# Patient Record
Sex: MALE | Race: WHITE | NOT HISPANIC OR LATINO | Employment: OTHER | URBAN - NONMETROPOLITAN AREA
[De-identification: names, ages, dates, MRNs, and addresses within clinical notes are randomized per-mention and may not be internally consistent; named-entity substitution may affect disease eponyms.]

---

## 2017-02-21 ENCOUNTER — OFFICE VISIT (OUTPATIENT)
Dept: NEUROSURGERY | Facility: CLINIC | Age: 62
End: 2017-02-21

## 2017-02-21 VITALS
WEIGHT: 270 LBS | SYSTOLIC BLOOD PRESSURE: 140 MMHG | HEIGHT: 68 IN | DIASTOLIC BLOOD PRESSURE: 80 MMHG | BODY MASS INDEX: 40.92 KG/M2

## 2017-02-21 DIAGNOSIS — E66.01 OBESITY, CLASS III, BMI 40-49.9 (MORBID OBESITY) (HCC): ICD-10-CM

## 2017-02-21 DIAGNOSIS — F17.210 CIGARETTE SMOKER: ICD-10-CM

## 2017-02-21 DIAGNOSIS — M51.37 DEGENERATION OF LUMBAR OR LUMBOSACRAL INTERVERTEBRAL DISC: Primary | ICD-10-CM

## 2017-02-21 DIAGNOSIS — M48.061 LUMBAR SPINAL STENOSIS: ICD-10-CM

## 2017-02-21 DIAGNOSIS — M51.26 LUMBAR DISC HERNIATION: ICD-10-CM

## 2017-02-21 PROCEDURE — 99203 OFFICE O/P NEW LOW 30 MIN: CPT | Performed by: NEUROLOGICAL SURGERY

## 2017-02-21 RX ORDER — BUDESONIDE AND FORMOTEROL FUMARATE DIHYDRATE 160; 4.5 UG/1; UG/1
2 AEROSOL RESPIRATORY (INHALATION)
COMMUNITY

## 2017-02-21 RX ORDER — FLUTICASONE PROPIONATE 50 MCG
2 SPRAY, SUSPENSION (ML) NASAL DAILY
COMMUNITY

## 2017-02-21 RX ORDER — ASPIRIN 81 MG/1
81 TABLET ORAL DAILY
COMMUNITY

## 2017-02-21 RX ORDER — CLOPIDOGREL BISULFATE 75 MG/1
75 TABLET ORAL DAILY
COMMUNITY

## 2017-02-21 RX ORDER — PRAVASTATIN SODIUM 40 MG
40 TABLET ORAL DAILY
COMMUNITY

## 2017-02-21 RX ORDER — HYDROCODONE BITARTRATE AND ACETAMINOPHEN 10; 325 MG/1; MG/1
1 TABLET ORAL EVERY 6 HOURS PRN
COMMUNITY

## 2017-02-21 RX ORDER — CETIRIZINE HYDROCHLORIDE 10 MG/1
10 TABLET ORAL 2 TIMES DAILY
COMMUNITY

## 2017-02-21 RX ORDER — LOSARTAN POTASSIUM AND HYDROCHLOROTHIAZIDE 25; 100 MG/1; MG/1
1 TABLET ORAL DAILY
COMMUNITY

## 2017-02-21 RX ORDER — CARVEDILOL 3.12 MG/1
3.12 TABLET ORAL 2 TIMES DAILY WITH MEALS
COMMUNITY
End: 2019-04-06 | Stop reason: HOSPADM

## 2017-02-21 RX ORDER — HYDROCHLOROTHIAZIDE 12.5 MG/1
12.5 TABLET ORAL DAILY
COMMUNITY

## 2017-02-21 RX ORDER — DULOXETIN HYDROCHLORIDE 60 MG/1
60 CAPSULE, DELAYED RELEASE ORAL DAILY
COMMUNITY

## 2017-02-21 NOTE — PATIENT INSTRUCTIONS
BMI for Adults  Body mass index (BMI) is a number that is calculated from a person's weight and height. In most adults, the number is used to find how much of an adult's weight is made up of fat. BMI is not as accurate as a direct measure of body fat.  HOW IS BMI CALCULATED?  BMI is calculated by dividing weight in kilograms by height in meters squared. It can also be calculated by dividing weight in pounds by height in inches squared, then multiplying the resulting number by 703. Charts are available to help you find your BMI quickly and easily without doing this calculation.   HOW IS BMI INTERPRETED?  Health care professionals use BMI charts to identify whether an adult is underweight, at a normal weight, or overweight based on the following guidelines:  · Underweight: BMI less than 18.5.  · Normal weight: BMI between 18.5 and 24.9.  · Overweight: BMI between 25 and 29.9.  · Obese: BMI of 30 and above.  BMI is usually interpreted the same for males and females.  Weight includes both fat and muscle, so someone with a muscular build, such as an athlete, may have a BMI that is higher than 24.9. In cases like these, BMI may not accurately depict body fat. To determine if excess body fat is the cause of a BMI of 25 or higher, further assessments may need to be done by a health care provider.  WHY IS BMI A USEFUL TOOL?  BMI is used to identify a possible weight problem that may be related to a medical problem or may increase the risk for medical problems. BMI can also be used to promote changes to reach a healthy weight.     This information is not intended to replace advice given to you by your health care provider. Make sure you discuss any questions you have with your health care provider.     Document Released: 08/29/2005 Document Revised: 01/08/2016 Document Reviewed: 05/15/2015  [employer] Interactive Patient Education ©2016 [employer]  Steps to Quit Smoking   Smoking tobacco can be harmful to your health and can  affect almost every organ in your body. Smoking puts you, and those around you, at risk for developing many serious chronic diseases. Quitting smoking is difficult, but it is one of the best things that you can do for your health. It is never too late to quit.  WHAT ARE THE BENEFITS OF QUITTING SMOKING?  When you quit smoking, you lower your risk of developing serious diseases and conditions, such as:  · Lung cancer or lung disease, such as COPD.  · Heart disease.  · Stroke.  · Heart attack.  · Infertility.  · Osteoporosis and bone fractures.  Additionally, symptoms such as coughing, wheezing, and shortness of breath may get better when you quit. You may also find that you get sick less often because your body is stronger at fighting off colds and infections. If you are pregnant, quitting smoking can help to reduce your chances of having a baby of low birth weight.  HOW DO I GET READY TO QUIT?  When you decide to quit smoking, create a plan to make sure that you are successful. Before you quit:  · Pick a date to quit. Set a date within the next two weeks to give you time to prepare.  · Write down the reasons why you are quitting. Keep this list in places where you will see it often, such as on your bathroom mirror or in your car or wallet.  · Identify the people, places, things, and activities that make you want to smoke (triggers) and avoid them. Make sure to take these actions:    Throw away all cigarettes at home, at work, and in your car.    Throw away smoking accessories, such as ashtrays and lighters.    Clean your car and make sure to empty the ashtray.    Clean your home, including curtains and carpets.  · Tell your family, friends, and coworkers that you are quitting. Support from your loved ones can make quitting easier.  · Talk with your health care provider about your options for quitting smoking.  · Find out what treatment options are covered by your health insurance.  WHAT STRATEGIES CAN I USE TO QUIT  "SMOKING?   Talk with your healthcare provider about different strategies to quit smoking. Some strategies include:  · Quitting smoking altogether instead of gradually lessening how much you smoke over a period of time. Research shows that quitting \"cold turkey\" is more successful than gradually quitting.  · Attending in-person counseling to help you build problem-solving skills. You are more likely to have success in quitting if you attend several counseling sessions. Even short sessions of 10 minutes can be effective.  · Finding resources and support systems that can help you to quit smoking and remain smoke-free after you quit. These resources are most helpful when you use them often. They can include:    Online chats with a counselor.    Telephone quitlines.    Printed self-help materials.    Support groups or group counseling.    Text messaging programs.    Mobile phone applications.  · Taking medicines to help you quit smoking. (If you are pregnant or breastfeeding, talk with your health care provider first.) Some medicines contain nicotine and some do not. Both types of medicines help with cravings, but the medicines that include nicotine help to relieve withdrawal symptoms. Your health care provider may recommend:    Nicotine patches, gum, or lozenges.    Nicotine inhalers or sprays.    Non-nicotine medicine that is taken by mouth.  Talk with your health care provider about combining strategies, such as taking medicines while you are also receiving in-person counseling. Using these two strategies together makes you more likely to succeed in quitting than if you used either strategy on its own.  If you are pregnant or breastfeeding, talk with your health care provider about finding counseling or other support strategies to quit smoking. Do not take medicine to help you quit smoking unless told to do so by your health care provider.  WHAT THINGS CAN I DO TO MAKE IT EASIER TO QUIT?  Quitting smoking might feel " overwhelming at first, but there is a lot that you can do to make it easier. Take these important actions:  · Reach out to your family and friends and ask that they support and encourage you during this time. Call telephone quitlines, reach out to support groups, or work with a counselor for support.  · Ask people who smoke to avoid smoking around you.  · Avoid places that trigger you to smoke, such as bars, parties, or smoke-break areas at work.  · Spend time around people who do not smoke.  · Lessen stress in your life, because stress can be a smoking trigger for some people. To lessen stress, try:    Exercising regularly.    Deep-breathing exercises.    Yoga.    Meditating.    Performing a body scan. This involves closing your eyes, scanning your body from head to toe, and noticing which parts of your body are particularly tense. Purposefully relax the muscles in those areas.  · Download or purchase mobile phone or tablet apps (applications) that can help you stick to your quit plan by providing reminders, tips, and encouragement. There are many free apps, such as QuitGuide from the CDC (Centers for Disease Control and Prevention). You can find other support for quitting smoking (smoking cessation) through smokefree.gov and other websites.  HOW WILL I FEEL WHEN I QUIT SMOKING?  Within the first 24 hours of quitting smoking, you may start to feel some withdrawal symptoms. These symptoms are usually most noticeable 2-3 days after quitting, but they usually do not last beyond 2-3 weeks. Changes or symptoms that you might experience include:  · Mood swings.  · Restlessness, anxiety, or irritation.  · Difficulty concentrating.  · Dizziness.  · Strong cravings for sugary foods in addition to nicotine.  · Mild weight gain.  · Constipation.  · Nausea.  · Coughing or a sore throat.  · Changes in how your medicines work in your body.  · A depressed mood.  · Difficulty sleeping (insomnia).  After the first 2-3 weeks of  quitting, you may start to notice more positive results, such as:  · Improved sense of smell and taste.  · Decreased coughing and sore throat.  · Slower heart rate.  · Lower blood pressure.  · Clearer skin.  · The ability to breathe more easily.  · Fewer sick days.  Quitting smoking is very challenging for most people. Do not get discouraged if you are not successful the first time. Some people need to make many attempts to quit before they achieve long-term success. Do your best to stick to your quit plan, and talk with your health care provider if you have any questions or concerns.     This information is not intended to replace advice given to you by your health care provider. Make sure you discuss any questions you have with your health care provider.     Document Released: 12/12/2002 Document Revised: 05/03/2016 Document Reviewed: 05/03/2016  [employer] Interactive Patient Education ©2016 [employer]

## 2017-02-22 NOTE — PROGRESS NOTES
Patient: Andrez Tsang  : 1955    Primary Care Provider: Susan Oliveira DO    Requesting Provider: No ref. provider found        History    Chief Complaint: Back pain  Chief Complaint   Patient presents with   • Back Pain     imaging from [facility]   • Leg Pain       History of Present Illness: This is a 61-year-old gentleman who has several year history of back pain.  He describes is intermittent but daily.  It is worsened by bending lifting twisting pushing or pulling standing for too long.  In general getting office he will bring him some relief but then he is very clear that if he holds any position for too long lying sitting or standing and he has to change positions to get relief.  He describes no lower extremity pain numbness or tingling. he is currently taking Norco 10 mg 4 a day.  He is done no chiropractic care, no physical therapy, no pain management injections.  He is disabled due to peripheral vascular disease.    Review of Systems   Musculoskeletal: Positive for back pain.   All other systems reviewed and are negative.      Past Medical History:   Past Medical History   Diagnosis Date   • Arthritis    • Asthma    • Chronic joint pain    • Chronic sinusitis    • Depression    • Shortness of breath        Past Surgical History:   Past Surgical History   Procedure Laterality Date   • Vasectomy     • Vascular surgery       femoral artery bypass - Dr Tom   • Gallbladder surgery     • Carotid endarterectomy  2016       Family History: family history includes Diabetes in his mother; Heart disease in his mother; Hypertension in his mother.    Social History:  reports that he has been smoking Cigarettes.  He has been smoking about 0.25 packs per day. He has never used smokeless tobacco. He reports that he does not drink alcohol or use illicit drugs.    Medications:    (Not in a hospital admission)    Allergies:  Iodides    Physical Exam:     Physical Exam   Constitutional: He is  oriented to person, place, and time. He appears well-developed and well-nourished.   HENT:   Head: Normocephalic and atraumatic.   Right Ear: Hearing normal.   Left Ear: Hearing normal.   Eyes: EOM are normal. Pupils are equal, round, and reactive to light.   Neck: Normal range of motion.   Cardiovascular: Normal rate, regular rhythm and normal heart sounds.    Pulmonary/Chest: Effort normal and breath sounds normal. No respiratory distress.   Abdominal: Soft. Bowel sounds are normal. He exhibits no distension. There is no tenderness.   Neurological: He is alert and oriented to person, place, and time. He has normal strength and normal reflexes. No cranial nerve deficit or sensory deficit. He displays a negative Romberg sign. GCS eye subscore is 4. GCS verbal subscore is 5. GCS motor subscore is 6. He displays no Babinski's sign on the right side. He displays no Babinski's sign on the left side.   Psychiatric: His speech is normal. Judgment normal. Cognition and memory are normal.       Neurologic Exam     Mental Status   Oriented to person, place, and time.   Speech: speech is normal     Cranial Nerves     CN III, IV, VI   Pupils are equal, round, and reactive to light.  Extraocular motions are normal.     Motor Exam     Strength   Strength 5/5 throughout.         Independent Review of Radiographic Studies:   MRI and plain films show degenerative disc disease at L5-S1 with Modic endplate changes.  He has moderate degenerative disc disease at L5-L4 with some disc bulging at that level    ASSESSMENT/PLAN: Mr. Tsang has mechanical back pain he has degenerative disc disease in his lower lumbar area.  He is a half a pack a day smoker and approximately 100 pounds overweight these are all factors contributing to his back pain.  Counseled him regarding these issues. We are going get him into a physical therapy program a referral for pain management injections.  We would like to see him go through complete course of  conservative therapy before considering any sort of surgery.  1. Degeneration of lumbar or lumbosacral intervertebral disc    2. Lumbar spinal stenosis    3. Lumbar disc herniation    4. Cigarette smoker    5. Obesity, Class III, BMI 40-49.9 (morbid obesity)          Return in about 6 weeks (around 4/4/2017) for PT follow up w/[facility].      Rudi Adorno MD

## 2017-07-24 ENCOUNTER — HOSPITAL ENCOUNTER (OUTPATIENT)
Dept: PAIN MANAGEMENT | Age: 62
Discharge: HOME | End: 2017-07-24
Payer: MEDICAID

## 2017-07-24 VITALS
HEIGHT: 68 IN | BODY MASS INDEX: 39.92 KG/M2 | TEMPERATURE: 98 F | SYSTOLIC BLOOD PRESSURE: 183 MMHG | DIASTOLIC BLOOD PRESSURE: 90 MMHG | OXYGEN SATURATION: 93 % | RESPIRATION RATE: 20 BRPM | WEIGHT: 263.38 LBS | HEART RATE: 91 BPM

## 2017-07-24 DIAGNOSIS — M54.59 LUMBAR FACET JOINT PAIN: ICD-10-CM

## 2017-07-24 DIAGNOSIS — M70.62 TROCHANTERIC BURSITIS OF BOTH HIPS: ICD-10-CM

## 2017-07-24 DIAGNOSIS — M54.50 CHRONIC BILATERAL LOW BACK PAIN WITHOUT SCIATICA: ICD-10-CM

## 2017-07-24 DIAGNOSIS — M70.61 TROCHANTERIC BURSITIS OF BOTH HIPS: ICD-10-CM

## 2017-07-24 DIAGNOSIS — G89.29 CHRONIC BILATERAL LOW BACK PAIN WITHOUT SCIATICA: ICD-10-CM

## 2017-07-24 DIAGNOSIS — M62.830 MUSCLE SPASM OF BACK: ICD-10-CM

## 2017-07-24 DIAGNOSIS — M53.3 SACROILIAC JOINT DYSFUNCTION OF LEFT SIDE: ICD-10-CM

## 2017-07-24 PROCEDURE — 99204 OFFICE O/P NEW MOD 45 MIN: CPT | Performed by: NURSE PRACTITIONER

## 2017-07-24 PROCEDURE — 99205 OFFICE O/P NEW HI 60 MIN: CPT

## 2017-07-24 RX ORDER — HYDROCODONE BITARTRATE AND ACETAMINOPHEN 10; 325 MG/1; MG/1
1 TABLET ORAL EVERY 6 HOURS PRN
Qty: 120 TABLET | Refills: 0 | Status: SHIPPED | OUTPATIENT
Start: 2017-07-27 | End: 2017-08-23 | Stop reason: SDUPTHER

## 2017-07-24 RX ORDER — HYDROCHLOROTHIAZIDE 12.5 MG/1
12.5 TABLET ORAL DAILY
Status: ON HOLD | COMMUNITY
End: 2022-10-09

## 2017-07-24 RX ORDER — ASPIRIN 81 MG/1
81 TABLET ORAL DAILY
COMMUNITY

## 2017-07-24 RX ORDER — DULOXETIN HYDROCHLORIDE 60 MG/1
60 CAPSULE, DELAYED RELEASE ORAL DAILY
COMMUNITY

## 2017-07-24 RX ORDER — CLOPIDOGREL BISULFATE 75 MG/1
75 TABLET ORAL DAILY
COMMUNITY

## 2017-07-24 RX ORDER — PRAVASTATIN SODIUM 40 MG
40 TABLET ORAL DAILY
Status: ON HOLD | COMMUNITY
End: 2022-10-09

## 2017-07-24 RX ORDER — HYDROCODONE BITARTRATE AND ACETAMINOPHEN 10; 325 MG/1; MG/1
1 TABLET ORAL EVERY 6 HOURS PRN
COMMUNITY
End: 2017-08-23 | Stop reason: SDUPTHER

## 2017-07-24 RX ORDER — LOSARTAN POTASSIUM 100 MG/1
100 TABLET ORAL DAILY
COMMUNITY

## 2017-07-24 RX ORDER — METHOCARBAMOL 750 MG/1
750 TABLET, FILM COATED ORAL 3 TIMES DAILY
Qty: 90 TABLET | Refills: 0 | Status: SHIPPED | OUTPATIENT
Start: 2017-07-24 | End: 2017-10-04 | Stop reason: SDUPTHER

## 2017-07-24 RX ORDER — CARVEDILOL 3.12 MG/1
3.12 TABLET ORAL 2 TIMES DAILY
Status: ON HOLD | COMMUNITY
End: 2022-10-09

## 2017-08-23 DIAGNOSIS — M54.59 LUMBAR FACET JOINT PAIN: ICD-10-CM

## 2017-08-23 DIAGNOSIS — M54.50 CHRONIC BILATERAL LOW BACK PAIN WITHOUT SCIATICA: ICD-10-CM

## 2017-08-23 DIAGNOSIS — M53.3 SACROILIAC JOINT DYSFUNCTION OF LEFT SIDE: ICD-10-CM

## 2017-08-23 DIAGNOSIS — G89.29 CHRONIC BILATERAL LOW BACK PAIN WITHOUT SCIATICA: ICD-10-CM

## 2017-08-23 DIAGNOSIS — M70.61 TROCHANTERIC BURSITIS OF BOTH HIPS: ICD-10-CM

## 2017-08-23 DIAGNOSIS — M62.830 MUSCLE SPASM OF BACK: ICD-10-CM

## 2017-08-23 DIAGNOSIS — M70.62 TROCHANTERIC BURSITIS OF BOTH HIPS: ICD-10-CM

## 2017-08-23 RX ORDER — HYDROCODONE BITARTRATE AND ACETAMINOPHEN 10; 325 MG/1; MG/1
1 TABLET ORAL EVERY 6 HOURS PRN
Qty: 120 TABLET | Refills: 0 | Status: SHIPPED | OUTPATIENT
Start: 2017-08-26 | End: 2017-09-21 | Stop reason: SDUPTHER

## 2017-09-21 DIAGNOSIS — M53.3 SACROILIAC JOINT DYSFUNCTION OF LEFT SIDE: ICD-10-CM

## 2017-09-21 DIAGNOSIS — M62.830 MUSCLE SPASM OF BACK: ICD-10-CM

## 2017-09-21 DIAGNOSIS — M54.59 LUMBAR FACET JOINT PAIN: ICD-10-CM

## 2017-09-21 DIAGNOSIS — G89.29 CHRONIC BILATERAL LOW BACK PAIN WITHOUT SCIATICA: ICD-10-CM

## 2017-09-21 DIAGNOSIS — M70.62 TROCHANTERIC BURSITIS OF BOTH HIPS: ICD-10-CM

## 2017-09-21 DIAGNOSIS — M54.50 CHRONIC BILATERAL LOW BACK PAIN WITHOUT SCIATICA: ICD-10-CM

## 2017-09-21 DIAGNOSIS — M70.61 TROCHANTERIC BURSITIS OF BOTH HIPS: ICD-10-CM

## 2017-09-21 RX ORDER — HYDROCODONE BITARTRATE AND ACETAMINOPHEN 10; 325 MG/1; MG/1
1 TABLET ORAL EVERY 6 HOURS PRN
Qty: 120 TABLET | Refills: 0 | Status: SHIPPED | OUTPATIENT
Start: 2017-09-25 | End: 2017-10-23 | Stop reason: SDUPTHER

## 2017-10-04 DIAGNOSIS — M62.830 MUSCLE SPASM OF BACK: ICD-10-CM

## 2017-10-04 DIAGNOSIS — M53.3 SACROILIAC JOINT DYSFUNCTION OF LEFT SIDE: ICD-10-CM

## 2017-10-04 DIAGNOSIS — M70.62 TROCHANTERIC BURSITIS OF BOTH HIPS: ICD-10-CM

## 2017-10-04 DIAGNOSIS — G89.29 CHRONIC BILATERAL LOW BACK PAIN WITHOUT SCIATICA: ICD-10-CM

## 2017-10-04 DIAGNOSIS — M70.61 TROCHANTERIC BURSITIS OF BOTH HIPS: ICD-10-CM

## 2017-10-04 DIAGNOSIS — M54.59 LUMBAR FACET JOINT PAIN: ICD-10-CM

## 2017-10-04 DIAGNOSIS — M54.50 CHRONIC BILATERAL LOW BACK PAIN WITHOUT SCIATICA: ICD-10-CM

## 2017-10-05 RX ORDER — METHOCARBAMOL 750 MG/1
750 TABLET, FILM COATED ORAL 3 TIMES DAILY
Qty: 90 TABLET | Refills: 0 | Status: SHIPPED | OUTPATIENT
Start: 2017-10-05 | End: 2017-12-19 | Stop reason: SDUPTHER

## 2017-10-23 DIAGNOSIS — M62.830 MUSCLE SPASM OF BACK: ICD-10-CM

## 2017-10-23 DIAGNOSIS — M70.61 TROCHANTERIC BURSITIS OF BOTH HIPS: ICD-10-CM

## 2017-10-23 DIAGNOSIS — M70.62 TROCHANTERIC BURSITIS OF BOTH HIPS: ICD-10-CM

## 2017-10-23 DIAGNOSIS — G89.29 CHRONIC BILATERAL LOW BACK PAIN WITHOUT SCIATICA: ICD-10-CM

## 2017-10-23 DIAGNOSIS — M54.50 CHRONIC BILATERAL LOW BACK PAIN WITHOUT SCIATICA: ICD-10-CM

## 2017-10-23 DIAGNOSIS — M54.59 LUMBAR FACET JOINT PAIN: ICD-10-CM

## 2017-10-23 DIAGNOSIS — M53.3 SACROILIAC JOINT DYSFUNCTION OF LEFT SIDE: ICD-10-CM

## 2017-10-24 RX ORDER — HYDROCODONE BITARTRATE AND ACETAMINOPHEN 10; 325 MG/1; MG/1
1 TABLET ORAL EVERY 6 HOURS PRN
Qty: 120 TABLET | Refills: 0 | Status: SHIPPED | OUTPATIENT
Start: 2017-10-25 | End: 2017-11-21 | Stop reason: SDUPTHER

## 2017-10-31 ENCOUNTER — HOSPITAL ENCOUNTER (OUTPATIENT)
Dept: PAIN MANAGEMENT | Age: 62
Discharge: HOME | End: 2017-10-31
Payer: MEDICAID

## 2017-10-31 VITALS
DIASTOLIC BLOOD PRESSURE: 70 MMHG | HEART RATE: 97 BPM | OXYGEN SATURATION: 97 % | WEIGHT: 265.38 LBS | SYSTOLIC BLOOD PRESSURE: 145 MMHG | HEIGHT: 68 IN | TEMPERATURE: 96.7 F | RESPIRATION RATE: 16 BRPM | BODY MASS INDEX: 40.22 KG/M2

## 2017-10-31 DIAGNOSIS — M62.830 MUSCLE SPASM OF BACK: ICD-10-CM

## 2017-10-31 DIAGNOSIS — M53.3 SACROILIAC JOINT DYSFUNCTION OF LEFT SIDE: ICD-10-CM

## 2017-10-31 DIAGNOSIS — M54.50 CHRONIC BILATERAL LOW BACK PAIN WITHOUT SCIATICA: ICD-10-CM

## 2017-10-31 DIAGNOSIS — M54.59 LUMBAR FACET JOINT PAIN: ICD-10-CM

## 2017-10-31 DIAGNOSIS — M70.62 TROCHANTERIC BURSITIS OF BOTH HIPS: ICD-10-CM

## 2017-10-31 DIAGNOSIS — G89.29 CHRONIC BILATERAL LOW BACK PAIN WITHOUT SCIATICA: ICD-10-CM

## 2017-10-31 DIAGNOSIS — M70.61 TROCHANTERIC BURSITIS OF BOTH HIPS: ICD-10-CM

## 2017-10-31 LAB — SUMMARY COMPLIANCE DRUG ANAL, UR: NORMAL ML

## 2017-10-31 PROCEDURE — 99213 OFFICE O/P EST LOW 20 MIN: CPT

## 2017-10-31 PROCEDURE — 80307 DRUG TEST PRSMV CHEM ANLYZR: CPT

## 2017-10-31 RX ORDER — LISINOPRIL 10 MG/1
1 TABLET ORAL DAILY
COMMUNITY
Start: 2017-10-20 | End: 2018-04-04 | Stop reason: DRUGHIGH

## 2017-11-02 NOTE — PROGRESS NOTES
[facility] FOLLOW-UP NOTE  10/31/2017    PCP: [facility] Pain Mgmt Provider: Miguelangel Knowles MSN,CNOR,RNFA,ARNP,FNP-C  Subjective:      PAIN HPI:  Mr Eusebio Rojas comes in today for f/u, he rates his Lbp at 1/10. The Pt is currently taking Norco, Cymbalta, Robaxin. Pt states that these are helpful. The Pt completed 4 visits of aquatic PT. Pt states that this was helpful               ALLERGIES:   Allergies   Allergen Reactions    Dye [Iodides]      Suspected with reaction 48 hours after angiogram       CURRENT MEDICATIONS:   No current facility-administered medications for this encounter. No results found. Objective:     Physical Exam:  Vitals:    10/31/17 0954   BP: (!) 145/70   Pulse: 97   Resp: 16   Temp: 96.7 °F (35.9 °C)   TempSrc: Temporal   SpO2: 97%   Weight: 265 lb 6 oz (120.4 kg)   Height: 5' 8\" (1.727 m)     General appearance: no acute distress  Head: NCAT, PERRLA, EOMI  Neck: supple, no masses  Lungs: CTABL  Heart: RRR  Abdomen: soft, nondistended, nontender, no guarding or peritoneal signs. Extremities: no rash cyanosis edema or jaundice  SKIN: Warm, Dry, and Intact   Musculoskeletal:No joint swelling, deformity, or tenderness. Back Exam:, tenderness: yes pain to palpation with spasm at bilateral Lumbar. Pain with palpation at Left SI Joint.   Neck Exam:no tenderness:   Hip Exam: Bilateral Greater trochanter tenderness  Shoulder Exam: good rom nontender  Knee exam: full rom no effusion  Neurologic: gait and coordination normal and speech normal  CN EXAM: II-XII intact  Reflexesbiceps, brachioradialis, triceps, patellar and Achilles 1/4  Sensation: Equal and intact to fine touch  Mood and affect: within normal limits    Review of Systems:  14 point ROS negative besides that noted in HPI  Complains of constipation negative      Imaging:                                                                                Assessment:     Patient Active

## 2017-11-21 DIAGNOSIS — M54.59 LUMBAR FACET JOINT PAIN: ICD-10-CM

## 2017-11-21 DIAGNOSIS — G89.29 CHRONIC BILATERAL LOW BACK PAIN WITHOUT SCIATICA: ICD-10-CM

## 2017-11-21 DIAGNOSIS — M70.61 TROCHANTERIC BURSITIS OF BOTH HIPS: ICD-10-CM

## 2017-11-21 DIAGNOSIS — M54.50 CHRONIC BILATERAL LOW BACK PAIN WITHOUT SCIATICA: ICD-10-CM

## 2017-11-21 DIAGNOSIS — M53.3 SACROILIAC JOINT DYSFUNCTION OF LEFT SIDE: ICD-10-CM

## 2017-11-21 DIAGNOSIS — M62.830 MUSCLE SPASM OF BACK: ICD-10-CM

## 2017-11-21 DIAGNOSIS — M70.62 TROCHANTERIC BURSITIS OF BOTH HIPS: ICD-10-CM

## 2017-11-22 RX ORDER — HYDROCODONE BITARTRATE AND ACETAMINOPHEN 10; 325 MG/1; MG/1
1 TABLET ORAL EVERY 6 HOURS PRN
Qty: 120 TABLET | Refills: 0 | Status: SHIPPED | OUTPATIENT
Start: 2017-11-24 | End: 2017-12-19 | Stop reason: SDUPTHER

## 2017-12-19 DIAGNOSIS — G89.29 CHRONIC BILATERAL LOW BACK PAIN WITHOUT SCIATICA: ICD-10-CM

## 2017-12-19 DIAGNOSIS — M70.62 TROCHANTERIC BURSITIS OF BOTH HIPS: ICD-10-CM

## 2017-12-19 DIAGNOSIS — M54.59 LUMBAR FACET JOINT PAIN: ICD-10-CM

## 2017-12-19 DIAGNOSIS — M53.3 SACROILIAC JOINT DYSFUNCTION OF LEFT SIDE: ICD-10-CM

## 2017-12-19 DIAGNOSIS — M62.830 MUSCLE SPASM OF BACK: ICD-10-CM

## 2017-12-19 DIAGNOSIS — M70.61 TROCHANTERIC BURSITIS OF BOTH HIPS: ICD-10-CM

## 2017-12-19 DIAGNOSIS — M54.50 CHRONIC BILATERAL LOW BACK PAIN WITHOUT SCIATICA: ICD-10-CM

## 2017-12-20 RX ORDER — METHOCARBAMOL 750 MG/1
750 TABLET, FILM COATED ORAL 3 TIMES DAILY PRN
Qty: 90 TABLET | Refills: 0 | Status: SHIPPED | OUTPATIENT
Start: 2017-12-20 | End: 2018-04-04 | Stop reason: SDUPTHER

## 2017-12-20 RX ORDER — HYDROCODONE BITARTRATE AND ACETAMINOPHEN 10; 325 MG/1; MG/1
1 TABLET ORAL EVERY 6 HOURS PRN
Qty: 120 TABLET | Refills: 0 | Status: SHIPPED | OUTPATIENT
Start: 2017-12-24 | End: 2018-01-17 | Stop reason: SDUPTHER

## 2018-01-17 DIAGNOSIS — M54.59 LUMBAR FACET JOINT PAIN: ICD-10-CM

## 2018-01-17 DIAGNOSIS — M70.62 TROCHANTERIC BURSITIS OF BOTH HIPS: ICD-10-CM

## 2018-01-17 DIAGNOSIS — M62.830 MUSCLE SPASM OF BACK: ICD-10-CM

## 2018-01-17 DIAGNOSIS — G89.29 CHRONIC BILATERAL LOW BACK PAIN WITHOUT SCIATICA: ICD-10-CM

## 2018-01-17 DIAGNOSIS — M70.61 TROCHANTERIC BURSITIS OF BOTH HIPS: ICD-10-CM

## 2018-01-17 DIAGNOSIS — M53.3 SACROILIAC JOINT DYSFUNCTION OF LEFT SIDE: ICD-10-CM

## 2018-01-17 DIAGNOSIS — M54.50 CHRONIC BILATERAL LOW BACK PAIN WITHOUT SCIATICA: ICD-10-CM

## 2018-01-17 RX ORDER — HYDROCODONE BITARTRATE AND ACETAMINOPHEN 10; 325 MG/1; MG/1
1 TABLET ORAL EVERY 6 HOURS PRN
Qty: 120 TABLET | Refills: 0 | Status: SHIPPED | OUTPATIENT
Start: 2018-01-23 | End: 2018-02-19 | Stop reason: SDUPTHER

## 2018-01-30 ENCOUNTER — HOSPITAL ENCOUNTER (OUTPATIENT)
Dept: PAIN MANAGEMENT | Age: 63
Discharge: HOME | End: 2018-01-30
Payer: MEDICAID

## 2018-01-30 DIAGNOSIS — M62.830 MUSCLE SPASM OF BACK: ICD-10-CM

## 2018-01-30 DIAGNOSIS — M54.50 CHRONIC BILATERAL LOW BACK PAIN WITHOUT SCIATICA: ICD-10-CM

## 2018-01-30 DIAGNOSIS — M70.62 TROCHANTERIC BURSITIS OF BOTH HIPS: ICD-10-CM

## 2018-01-30 DIAGNOSIS — M54.59 LUMBAR FACET JOINT PAIN: ICD-10-CM

## 2018-01-30 DIAGNOSIS — M53.3 SACROILIAC JOINT DYSFUNCTION OF LEFT SIDE: ICD-10-CM

## 2018-01-30 DIAGNOSIS — M70.61 TROCHANTERIC BURSITIS OF BOTH HIPS: ICD-10-CM

## 2018-01-30 DIAGNOSIS — G89.29 CHRONIC BILATERAL LOW BACK PAIN WITHOUT SCIATICA: ICD-10-CM

## 2018-01-30 PROCEDURE — 99211 OFF/OP EST MAY X REQ PHY/QHP: CPT

## 2018-01-30 NOTE — PROGRESS NOTES
Current Medication Regimen Working? yes    Current Pain Level (1-10 Scale)/Location: 3    Current Health Issues/Falls/ER Visit:In-patient 8 days flu / pneumonia/ and staph one month ago    Previous Back or Neck Surgeries: n/a    Last Procedure Performed: 10/17                                                                Percentage of Pain Relief from Last procedure:    How Long it Lasted:     Recent Radiology/Labs/ Physical Therapy: aquatic therapy 6/17    Any change in bowel habits if on chronic narcotics  constipation   If yes, do you use laxatives regularly yes miralax daily            Has patient considered harming themselves to escape stress, pain , or problems:  ( ) YES or ( x) NO    (  ) Suspected Physical Abuse or Suicide Risk assessed : (See Suicide Risk Assessment progress note if checked)            Education Provided: ( x ) Review of Daniel  (  ) Agreement Review    (  ) Compliance Issues Discussed  (  ) Cognitive Behavioral Needs   (  ) Exercise  (  ) Review of Test  (  ) Financial Issues  ( x ) Tobacco/Alcohol Use  ( x ) Teaching  (  ) New Patient  (  ) Picture Obtained  ( ) Test Ordered    (  ) Outgoing Referral  ( ) Records Obtained

## 2018-02-19 DIAGNOSIS — G89.29 CHRONIC BILATERAL LOW BACK PAIN WITHOUT SCIATICA: ICD-10-CM

## 2018-02-19 DIAGNOSIS — M53.3 SACROILIAC JOINT DYSFUNCTION OF LEFT SIDE: ICD-10-CM

## 2018-02-19 DIAGNOSIS — M54.50 CHRONIC BILATERAL LOW BACK PAIN WITHOUT SCIATICA: ICD-10-CM

## 2018-02-19 DIAGNOSIS — M70.61 TROCHANTERIC BURSITIS OF BOTH HIPS: ICD-10-CM

## 2018-02-19 DIAGNOSIS — M62.830 MUSCLE SPASM OF BACK: ICD-10-CM

## 2018-02-19 DIAGNOSIS — M70.62 TROCHANTERIC BURSITIS OF BOTH HIPS: ICD-10-CM

## 2018-02-19 DIAGNOSIS — M54.59 LUMBAR FACET JOINT PAIN: ICD-10-CM

## 2018-02-20 RX ORDER — HYDROCODONE BITARTRATE AND ACETAMINOPHEN 10; 325 MG/1; MG/1
1 TABLET ORAL EVERY 6 HOURS PRN
Qty: 120 TABLET | Refills: 0 | Status: SHIPPED | OUTPATIENT
Start: 2018-02-22 | End: 2018-03-20 | Stop reason: SDUPTHER

## 2018-03-20 DIAGNOSIS — M70.62 TROCHANTERIC BURSITIS OF BOTH HIPS: ICD-10-CM

## 2018-03-20 DIAGNOSIS — M53.3 SACROILIAC JOINT DYSFUNCTION OF LEFT SIDE: ICD-10-CM

## 2018-03-20 DIAGNOSIS — M70.61 TROCHANTERIC BURSITIS OF BOTH HIPS: ICD-10-CM

## 2018-03-20 DIAGNOSIS — M54.50 CHRONIC BILATERAL LOW BACK PAIN WITHOUT SCIATICA: ICD-10-CM

## 2018-03-20 DIAGNOSIS — M54.59 LUMBAR FACET JOINT PAIN: ICD-10-CM

## 2018-03-20 DIAGNOSIS — M62.830 MUSCLE SPASM OF BACK: ICD-10-CM

## 2018-03-20 DIAGNOSIS — G89.29 CHRONIC BILATERAL LOW BACK PAIN WITHOUT SCIATICA: ICD-10-CM

## 2018-03-20 RX ORDER — HYDROCODONE BITARTRATE AND ACETAMINOPHEN 10; 325 MG/1; MG/1
1 TABLET ORAL EVERY 6 HOURS PRN
Qty: 120 TABLET | Refills: 0 | Status: SHIPPED | OUTPATIENT
Start: 2018-03-24 | End: 2018-04-17 | Stop reason: SDUPTHER

## 2018-04-04 ENCOUNTER — HOSPITAL ENCOUNTER (OUTPATIENT)
Dept: PAIN MANAGEMENT | Age: 63
Discharge: HOME | End: 2018-04-04
Payer: MEDICAID

## 2018-04-04 VITALS
HEIGHT: 68 IN | BODY MASS INDEX: 39.03 KG/M2 | HEART RATE: 79 BPM | RESPIRATION RATE: 20 BRPM | WEIGHT: 257.5 LBS | TEMPERATURE: 99 F | OXYGEN SATURATION: 93 % | SYSTOLIC BLOOD PRESSURE: 159 MMHG | DIASTOLIC BLOOD PRESSURE: 79 MMHG

## 2018-04-04 DIAGNOSIS — M54.59 LUMBAR FACET JOINT PAIN: ICD-10-CM

## 2018-04-04 DIAGNOSIS — M70.61 TROCHANTERIC BURSITIS OF BOTH HIPS: ICD-10-CM

## 2018-04-04 DIAGNOSIS — M54.50 CHRONIC BILATERAL LOW BACK PAIN WITHOUT SCIATICA: ICD-10-CM

## 2018-04-04 DIAGNOSIS — M53.3 SACROILIAC JOINT DYSFUNCTION OF LEFT SIDE: ICD-10-CM

## 2018-04-04 DIAGNOSIS — M70.62 TROCHANTERIC BURSITIS OF BOTH HIPS: ICD-10-CM

## 2018-04-04 DIAGNOSIS — G89.29 CHRONIC BILATERAL LOW BACK PAIN WITHOUT SCIATICA: ICD-10-CM

## 2018-04-04 DIAGNOSIS — M62.830 MUSCLE SPASM OF BACK: ICD-10-CM

## 2018-04-04 PROCEDURE — 99214 OFFICE O/P EST MOD 30 MIN: CPT | Performed by: NURSE PRACTITIONER

## 2018-04-04 PROCEDURE — 99212 OFFICE O/P EST SF 10 MIN: CPT

## 2018-04-04 RX ORDER — METHOCARBAMOL 750 MG/1
750 TABLET, FILM COATED ORAL 3 TIMES DAILY PRN
Qty: 90 TABLET | Refills: 0 | Status: SHIPPED | OUTPATIENT
Start: 2018-04-04 | End: 2018-05-04

## 2018-04-04 RX ORDER — HYDROCODONE BITARTRATE AND ACETAMINOPHEN 7.5; 325 MG/1; MG/1
1 TABLET ORAL EVERY 6 HOURS PRN
Qty: 120 TABLET | Refills: 0 | Status: SHIPPED | OUTPATIENT
Start: 2018-04-13 | End: 2018-04-04 | Stop reason: CLARIF

## 2018-04-04 RX ORDER — LISINOPRIL 40 MG/1
1 TABLET ORAL DAILY
COMMUNITY
Start: 2018-03-06 | End: 2018-07-11 | Stop reason: ALTCHOICE

## 2018-04-17 DIAGNOSIS — M53.3 SACROILIAC JOINT DYSFUNCTION OF LEFT SIDE: ICD-10-CM

## 2018-04-17 DIAGNOSIS — M70.62 TROCHANTERIC BURSITIS OF BOTH HIPS: ICD-10-CM

## 2018-04-17 DIAGNOSIS — M62.830 MUSCLE SPASM OF BACK: ICD-10-CM

## 2018-04-17 DIAGNOSIS — M54.50 CHRONIC BILATERAL LOW BACK PAIN WITHOUT SCIATICA: ICD-10-CM

## 2018-04-17 DIAGNOSIS — M54.59 LUMBAR FACET JOINT PAIN: ICD-10-CM

## 2018-04-17 DIAGNOSIS — M70.61 TROCHANTERIC BURSITIS OF BOTH HIPS: ICD-10-CM

## 2018-04-17 DIAGNOSIS — G89.29 CHRONIC BILATERAL LOW BACK PAIN WITHOUT SCIATICA: ICD-10-CM

## 2018-04-18 RX ORDER — HYDROCODONE BITARTRATE AND ACETAMINOPHEN 10; 325 MG/1; MG/1
1 TABLET ORAL EVERY 6 HOURS PRN
Qty: 120 TABLET | Refills: 0 | Status: SHIPPED | OUTPATIENT
Start: 2018-04-23 | End: 2018-05-15 | Stop reason: SDUPTHER

## 2018-05-15 DIAGNOSIS — G89.29 CHRONIC BILATERAL LOW BACK PAIN WITHOUT SCIATICA: ICD-10-CM

## 2018-05-15 DIAGNOSIS — M70.61 TROCHANTERIC BURSITIS OF BOTH HIPS: ICD-10-CM

## 2018-05-15 DIAGNOSIS — M54.59 LUMBAR FACET JOINT PAIN: ICD-10-CM

## 2018-05-15 DIAGNOSIS — M54.50 CHRONIC BILATERAL LOW BACK PAIN WITHOUT SCIATICA: ICD-10-CM

## 2018-05-15 DIAGNOSIS — M62.830 MUSCLE SPASM OF BACK: ICD-10-CM

## 2018-05-15 DIAGNOSIS — M53.3 SACROILIAC JOINT DYSFUNCTION OF LEFT SIDE: ICD-10-CM

## 2018-05-15 DIAGNOSIS — M70.62 TROCHANTERIC BURSITIS OF BOTH HIPS: ICD-10-CM

## 2018-05-17 RX ORDER — HYDROCODONE BITARTRATE AND ACETAMINOPHEN 10; 325 MG/1; MG/1
1 TABLET ORAL EVERY 6 HOURS PRN
Qty: 120 TABLET | Refills: 0 | Status: SHIPPED | OUTPATIENT
Start: 2018-05-23 | End: 2018-06-19 | Stop reason: SDUPTHER

## 2018-06-19 DIAGNOSIS — M53.3 SACROILIAC JOINT DYSFUNCTION OF LEFT SIDE: ICD-10-CM

## 2018-06-19 DIAGNOSIS — M70.61 TROCHANTERIC BURSITIS OF BOTH HIPS: ICD-10-CM

## 2018-06-19 DIAGNOSIS — M54.50 CHRONIC BILATERAL LOW BACK PAIN WITHOUT SCIATICA: ICD-10-CM

## 2018-06-19 DIAGNOSIS — M62.830 MUSCLE SPASM OF BACK: ICD-10-CM

## 2018-06-19 DIAGNOSIS — G89.29 CHRONIC BILATERAL LOW BACK PAIN WITHOUT SCIATICA: ICD-10-CM

## 2018-06-19 DIAGNOSIS — M54.59 LUMBAR FACET JOINT PAIN: ICD-10-CM

## 2018-06-19 DIAGNOSIS — M70.62 TROCHANTERIC BURSITIS OF BOTH HIPS: ICD-10-CM

## 2018-06-20 RX ORDER — HYDROCODONE BITARTRATE AND ACETAMINOPHEN 10; 325 MG/1; MG/1
1 TABLET ORAL EVERY 6 HOURS PRN
Qty: 120 TABLET | Refills: 0 | Status: SHIPPED | OUTPATIENT
Start: 2018-06-22 | End: 2018-07-22

## 2018-07-11 ENCOUNTER — HOSPITAL ENCOUNTER (OUTPATIENT)
Dept: PAIN MANAGEMENT | Age: 63
Discharge: HOME | End: 2018-07-11
Payer: MEDICAID

## 2018-07-11 VITALS
DIASTOLIC BLOOD PRESSURE: 85 MMHG | TEMPERATURE: 98.2 F | RESPIRATION RATE: 20 BRPM | HEIGHT: 68 IN | HEART RATE: 82 BPM | WEIGHT: 255 LBS | BODY MASS INDEX: 38.65 KG/M2 | SYSTOLIC BLOOD PRESSURE: 160 MMHG | OXYGEN SATURATION: 94 %

## 2018-07-11 DIAGNOSIS — M54.59 LUMBAR FACET JOINT PAIN: ICD-10-CM

## 2018-07-11 DIAGNOSIS — M70.62 TROCHANTERIC BURSITIS OF BOTH HIPS: ICD-10-CM

## 2018-07-11 DIAGNOSIS — G89.29 CHRONIC BILATERAL LOW BACK PAIN WITHOUT SCIATICA: ICD-10-CM

## 2018-07-11 DIAGNOSIS — M54.50 CHRONIC BILATERAL LOW BACK PAIN WITHOUT SCIATICA: ICD-10-CM

## 2018-07-11 DIAGNOSIS — M70.61 TROCHANTERIC BURSITIS OF BOTH HIPS: ICD-10-CM

## 2018-07-11 DIAGNOSIS — M53.3 SACROILIAC JOINT DYSFUNCTION OF LEFT SIDE: ICD-10-CM

## 2018-07-11 DIAGNOSIS — M62.830 MUSCLE SPASM OF BACK: ICD-10-CM

## 2018-07-11 PROCEDURE — 99213 OFFICE O/P EST LOW 20 MIN: CPT | Performed by: NURSE PRACTITIONER

## 2018-07-11 PROCEDURE — 99213 OFFICE O/P EST LOW 20 MIN: CPT

## 2018-07-11 RX ORDER — HYDROCODONE BITARTRATE AND ACETAMINOPHEN 10; 325 MG/1; MG/1
1 TABLET ORAL EVERY 6 HOURS PRN
Qty: 120 TABLET | Refills: 0 | Status: SHIPPED | OUTPATIENT
Start: 2018-07-22 | End: 2018-08-15 | Stop reason: SDUPTHER

## 2018-07-11 RX ORDER — METHOCARBAMOL 750 MG/1
750 TABLET, FILM COATED ORAL 3 TIMES DAILY PRN
Qty: 90 TABLET | Refills: 0 | Status: SHIPPED | OUTPATIENT
Start: 2018-07-11 | End: 2018-08-10

## 2018-07-11 ASSESSMENT — PAIN DESCRIPTION - ONSET: ONSET: ON-GOING

## 2018-07-11 ASSESSMENT — PAIN DESCRIPTION - LOCATION: LOCATION: BACK

## 2018-07-11 ASSESSMENT — PAIN DESCRIPTION - PAIN TYPE: TYPE: CHRONIC PAIN

## 2018-07-11 ASSESSMENT — PAIN DESCRIPTION - DIRECTION: RADIATING_TOWARDS: BILATERAL HIPS

## 2018-07-11 ASSESSMENT — PAIN DESCRIPTION - DESCRIPTORS: DESCRIPTORS: ACHING;CONSTANT

## 2018-07-11 ASSESSMENT — PAIN DESCRIPTION - FREQUENCY: FREQUENCY: CONTINUOUS

## 2018-07-11 ASSESSMENT — PAIN DESCRIPTION - PROGRESSION: CLINICAL_PROGRESSION: NOT CHANGED

## 2018-07-11 ASSESSMENT — PAIN SCALES - GENERAL: PAINLEVEL_OUTOF10: 4

## 2018-07-11 ASSESSMENT — PAIN DESCRIPTION - ORIENTATION: ORIENTATION: LOWER

## 2018-07-11 NOTE — PROGRESS NOTES
to Pain Intervention: None  Multiple Pain Sites: No      Recent Imaging: none    Physical Exam:    Vitals:    07/11/18 0849   BP: (!) 160/85   Pulse: 82   Resp: 20   Temp: 98.2 °F (36.8 °C)   TempSrc: Temporal   SpO2: 94%   Weight: 255 lb (115.7 kg)   Height: 5' 8\" (1.727 m)       Body mass index is 38.77 kg/m². Physical Exam    General appearance: no acute distress  Skin: Warm and dry. No jaundice  Head: NCAT, PERRLA, EOMI, moist mucus membranes, scalp normal  Neck: Supple, no masses  Lungs: Clear to ausculation in all lobes anterior and posterior  Heart[de-identified] Regular rate and rhythm, no gallops, rubs or murmurs  Abdomen: Bowel sounds in all quadrants, soft, non-distended, non-tender with palpation, no guarding   Extremities: No rash, cyanosis or edema  Musculoskeletal: No joint swelling or deformity. Back Exam: pain with flexion  Neurologic Exam: Gait and coordination normal, speech normal  CN EXAM: II-XII intact  Reflexes: Negative Sorensen's bilateral. Normal Patellar bilateral.  Strength:  5/5 RUE Bi's/Tri's, 5/5 LUE Bi's/Tri's, 5/5 RLE knee flex/ext, 5/5 RLE DF/PF, 5/5 LLE knee flex/ext, 5/5 LLE DF/PF  Sensation: Equal and intact to fine touch  Mood and affect: within normal limits             Assessment:  Patient Active Problem List   Diagnosis    Carotid artery stenosis    [facility]) [facility]    Essential hypertension    PVD (peripheral vascular disease) with claudication ([facility]    Dilatation of aorta ([facility]    Chronic bilateral low back pain without sciatica    Lumbar facet joint pain    Muscle spasm of back    Sacroiliac joint dysfunction of left side    Trochanteric bursitis of both hips        Plan:  1. Patient to call office with any questions or concerns that may arise prior to next appointment. 2. Continue Norco and Robaxin  3.  F/U in 3 months    Discussion:    Activity: discussed exercise as beneficial to pain reduction, encouraged stretching exercise with a focus on torso strengthening. Education Provided:  [x] Review of Willia Comes [x] Agreement Review [] Compliance Issues Discussed   [] Cognitive Behavior Needs [x] Exercise [] Review of Test [] Financial Issues   [] Tobacco/Alcohol Use [x] Teaching [] New Patient Picture Obtained    Controlled Substance Monitoring:   Discussed with patient possible medication side effects, risk of tolerance, dependence and alternative treatments. Discussed the growing epidemic in the [place] with the overprescribing and at times the abuse of narcotics. Discussed the detrimental effects of long term narcotic use. Patient encouraged to set daily goals of exercising and decreasing daily narcotic intake. Discussed with the patient about the development of hyperalgesia with long term narcotic intake.      CC:  DO Livia Nuñez, SIERRA, 7/11/2018 at 9:06 AM

## 2018-08-15 DIAGNOSIS — M70.62 TROCHANTERIC BURSITIS OF BOTH HIPS: ICD-10-CM

## 2018-08-15 DIAGNOSIS — G89.29 CHRONIC BILATERAL LOW BACK PAIN WITHOUT SCIATICA: ICD-10-CM

## 2018-08-15 DIAGNOSIS — M54.59 LUMBAR FACET JOINT PAIN: ICD-10-CM

## 2018-08-15 DIAGNOSIS — M62.830 MUSCLE SPASM OF BACK: ICD-10-CM

## 2018-08-15 DIAGNOSIS — M53.3 SACROILIAC JOINT DYSFUNCTION OF LEFT SIDE: ICD-10-CM

## 2018-08-15 DIAGNOSIS — M70.61 TROCHANTERIC BURSITIS OF BOTH HIPS: ICD-10-CM

## 2018-08-15 DIAGNOSIS — M54.50 CHRONIC BILATERAL LOW BACK PAIN WITHOUT SCIATICA: ICD-10-CM

## 2018-08-15 RX ORDER — HYDROCODONE BITARTRATE AND ACETAMINOPHEN 10; 325 MG/1; MG/1
1 TABLET ORAL EVERY 6 HOURS PRN
Qty: 120 TABLET | Refills: 0 | Status: SHIPPED | OUTPATIENT
Start: 2018-08-21 | End: 2018-09-11 | Stop reason: SDUPTHER

## 2018-09-11 DIAGNOSIS — M54.59 LUMBAR FACET JOINT PAIN: ICD-10-CM

## 2018-09-11 DIAGNOSIS — M70.62 TROCHANTERIC BURSITIS OF BOTH HIPS: ICD-10-CM

## 2018-09-11 DIAGNOSIS — M70.61 TROCHANTERIC BURSITIS OF BOTH HIPS: ICD-10-CM

## 2018-09-11 DIAGNOSIS — M62.830 MUSCLE SPASM OF BACK: ICD-10-CM

## 2018-09-11 DIAGNOSIS — M54.50 CHRONIC BILATERAL LOW BACK PAIN WITHOUT SCIATICA: ICD-10-CM

## 2018-09-11 DIAGNOSIS — G89.29 CHRONIC BILATERAL LOW BACK PAIN WITHOUT SCIATICA: ICD-10-CM

## 2018-09-11 DIAGNOSIS — M53.3 SACROILIAC JOINT DYSFUNCTION OF LEFT SIDE: ICD-10-CM

## 2018-09-11 NOTE — TELEPHONE ENCOUNTER
Patient called and requested a refill on his Norco. Per Ella Whitman due 09/20/2018 (Patient filled 1 day early last month).

## 2018-09-12 RX ORDER — HYDROCODONE BITARTRATE AND ACETAMINOPHEN 10; 325 MG/1; MG/1
1 TABLET ORAL EVERY 6 HOURS PRN
Qty: 120 TABLET | Refills: 0 | Status: SHIPPED | OUTPATIENT
Start: 2018-09-20 | End: 2018-10-16 | Stop reason: SDUPTHER

## 2018-10-03 ENCOUNTER — HOSPITAL ENCOUNTER (OUTPATIENT)
Dept: PAIN MANAGEMENT | Age: 63
Discharge: HOME | End: 2018-10-03
Payer: MEDICAID

## 2018-10-03 VITALS
BODY MASS INDEX: 38.97 KG/M2 | DIASTOLIC BLOOD PRESSURE: 82 MMHG | RESPIRATION RATE: 20 BRPM | OXYGEN SATURATION: 93 % | HEART RATE: 77 BPM | SYSTOLIC BLOOD PRESSURE: 175 MMHG | TEMPERATURE: 98.3 F | HEIGHT: 68 IN | WEIGHT: 257.13 LBS

## 2018-10-03 DIAGNOSIS — M54.50 CHRONIC BILATERAL LOW BACK PAIN WITHOUT SCIATICA: ICD-10-CM

## 2018-10-03 DIAGNOSIS — M70.61 TROCHANTERIC BURSITIS OF BOTH HIPS: ICD-10-CM

## 2018-10-03 DIAGNOSIS — M70.62 TROCHANTERIC BURSITIS OF BOTH HIPS: ICD-10-CM

## 2018-10-03 DIAGNOSIS — G89.29 CHRONIC BILATERAL LOW BACK PAIN WITHOUT SCIATICA: ICD-10-CM

## 2018-10-03 DIAGNOSIS — M62.830 MUSCLE SPASM OF BACK: ICD-10-CM

## 2018-10-03 DIAGNOSIS — M53.3 SACROILIAC JOINT DYSFUNCTION OF LEFT SIDE: ICD-10-CM

## 2018-10-03 DIAGNOSIS — M54.59 LUMBAR FACET JOINT PAIN: ICD-10-CM

## 2018-10-03 LAB — SUMMARY COMPLIANCE DRUG ANAL, UR: NORMAL ML

## 2018-10-03 PROCEDURE — 99213 OFFICE O/P EST LOW 20 MIN: CPT | Performed by: NURSE PRACTITIONER

## 2018-10-03 PROCEDURE — 99212 OFFICE O/P EST SF 10 MIN: CPT

## 2018-10-03 ASSESSMENT — PAIN SCALES - GENERAL: PAINLEVEL_OUTOF10: 2

## 2018-10-03 ASSESSMENT — PAIN DESCRIPTION - PAIN TYPE: TYPE: CHRONIC PAIN

## 2018-10-03 ASSESSMENT — PAIN DESCRIPTION - FREQUENCY: FREQUENCY: CONTINUOUS

## 2018-10-03 ASSESSMENT — PAIN DESCRIPTION - LOCATION: LOCATION: BACK

## 2018-10-03 ASSESSMENT — ACTIVITIES OF DAILY LIVING (ADL): EFFECT OF PAIN ON DAILY ACTIVITIES: LIMITS ACTIVITIES

## 2018-10-03 ASSESSMENT — PAIN DESCRIPTION - ONSET: ONSET: ON-GOING

## 2018-10-03 ASSESSMENT — PAIN DESCRIPTION - PROGRESSION: CLINICAL_PROGRESSION: NOT CHANGED

## 2018-10-03 ASSESSMENT — PAIN DESCRIPTION - ORIENTATION: ORIENTATION: LOWER

## 2018-10-03 ASSESSMENT — PAIN DESCRIPTION - DESCRIPTORS: DESCRIPTORS: CONSTANT;ACHING

## 2018-10-03 NOTE — PROGRESS NOTES
Nursing Admission Record    Current Issues / Falls / ER Visits:  No    Percentage of Pain Relief after Last Procedure:  NA %    How long lasted:  0 days    Radiology exams received during the last 12 months: No       When NA                                              WhereNA       Imaging on chart: No         Imaging records requested: No  MRI exams received in the past 2 years:  No  Physical therapy during the last 6 months: No       When: NA                                             Where NA  Labs during the last 12 months: Yes                                                                         PEG Score    1. What number best describes your pain on average in the past week? 0           1          2         3         4        5        6         7       8         9     10   No Pain                                                                                               Pain as bad as you can imagine              Score_3__________    2. What number best describes how, during the past week, pain has interfered with your enjoyment of life? 0           1           2         3          4       5        6        7        8         9      10   Does not                                                                                              Completely     interfere                                                                                                 interferes            Score___0________    3. What number best describes how, during the past week, pain has interfered with your general activity?        0     1               2           3          4         5        6        7       8         9       10   Does not                                                                                               Completely    interfere                                                                                                  interferes           Score___6________ Total Score ÷ 3 = Score                                                                                                 Score____3____                                                                                              Education Provided:  [x] Review of Marianna Castorena  [] Agreement Review  [] Compliance Issues Discussed    [] Cognitive Behavior Needs [] Exercise [] Review of Test [] Financial Issues  [] Tobacco/Alcohol Use [x] Teaching [] New Patient [] Picture Obtained    Physician Plan:  [] Outgoing Referral  [] Pharmacy Consult  [x] Test Ordered   [] Obtained Test Results / Consult Notes  [] UDS due at next visit, verified per EPIC      [] Suspected Physical Abuse or Suicide Risk assessed - IF YES COMPLETE QUESTIONS BELOW    If any of the following questions are answered yes - contact attending physician for referral:    Has been considering harming self to escape stress, pain problems? [] YES  [x] NO  Has a suicide plan? [] YES  [] NO  Has attempted suicide in the past?   [] YES  [] NO  Has a close friend or family member who committed suicide? [] YES  [] NO    Patient Referred To : Additional Notes: pt here for 3 month follow up.  No new issues    Assessment Completed by:  Electronically signed by Nevin Willams RN on 10/3/2018 at 9:05 AM

## 2018-10-03 NOTE — PROGRESS NOTES
Exam:    Vitals:    10/03/18 0903   BP: (!) 175/82   Pulse: 77   Resp: 20   Temp: 98.3 °F (36.8 °C)   TempSrc: Temporal   SpO2: 93%   Weight: 257 lb 2 oz (116.6 kg)   Height: 5' 8\" (1.727 m)       Body mass index is 39.1 kg/m². Physical Exam    General appearance: no acute distress  Skin: Warm and dry. No jaundice  Head: NCAT, PERRLA, EOMI, moist mucus membranes, scalp normal  Neck: Supple, no masses  Lungs: Clear to ausculation in all lobes anterior and posterior  Heart[de-identified] Regular rate and rhythm, no gallops, rubs or murmurs  Abdomen: Bowel sounds in all quadrants, soft, non-distended, non-tender with palpation, no guarding   Extremities: No rash, cyanosis or edema  Musculoskeletal: No joint swelling or deformity. Back Exam: Pain in lumbar spine with flexion   Neurologic Exam: Gait and coordination normal, speech normal  CN EXAM: II-XII intact  Reflexes: Negative Sorensen's bilateral. Normal Patellar bilateral.  Strength:  5/5 RUE Bi's/Tri's, 5/5 LUE Bi's/Tri's, 5/5 RLE knee flex/ext, 5/5 RLE DF/PF, 5/5 LLE knee flex/ext, 5/5 LLE DF/PF  Sensation: Equal and intact to fine touch  Mood and affect: within normal limits             Assessment:  Patient Active Problem List   Diagnosis    Carotid artery stenosis    [facility]ch[facility]) [facility]    Essential hypertension    PVD (peripheral vascular disease) with claudication ([facility])    Dilatation of aorta ([facility])    Chronic bilateral low back pain without sciatica    Lumbar facet joint pain    Muscle spasm of back    Sacroiliac joint dysfunction of left side    Trochanteric bursitis of both hips        Plan:  1. Patient to call office with any questions or concerns that may arise prior to next appointment. 2. Continue Norco, Cymbalta  3. Follow-up 3 months    [x]  Urine Drug Screen Today    Discussion:  Discussed exam findings and plan of care with patient. Patient agreed with the current plan of care at this time.  All questions from the

## 2018-10-16 DIAGNOSIS — M62.830 MUSCLE SPASM OF BACK: ICD-10-CM

## 2018-10-16 DIAGNOSIS — M53.3 SACROILIAC JOINT DYSFUNCTION OF LEFT SIDE: ICD-10-CM

## 2018-10-16 DIAGNOSIS — M70.62 TROCHANTERIC BURSITIS OF BOTH HIPS: ICD-10-CM

## 2018-10-16 DIAGNOSIS — M54.59 LUMBAR FACET JOINT PAIN: ICD-10-CM

## 2018-10-16 DIAGNOSIS — G89.29 CHRONIC BILATERAL LOW BACK PAIN WITHOUT SCIATICA: ICD-10-CM

## 2018-10-16 DIAGNOSIS — M70.61 TROCHANTERIC BURSITIS OF BOTH HIPS: ICD-10-CM

## 2018-10-16 DIAGNOSIS — M54.50 CHRONIC BILATERAL LOW BACK PAIN WITHOUT SCIATICA: ICD-10-CM

## 2018-10-16 RX ORDER — HYDROCODONE BITARTRATE AND ACETAMINOPHEN 10; 325 MG/1; MG/1
1 TABLET ORAL EVERY 6 HOURS PRN
Qty: 120 TABLET | Refills: 0 | Status: SHIPPED | OUTPATIENT
Start: 2018-10-20 | End: 2018-11-14 | Stop reason: SDUPTHER

## 2018-11-14 DIAGNOSIS — M54.59 LUMBAR FACET JOINT PAIN: ICD-10-CM

## 2018-11-14 DIAGNOSIS — G89.29 CHRONIC BILATERAL LOW BACK PAIN WITHOUT SCIATICA: ICD-10-CM

## 2018-11-14 DIAGNOSIS — M70.62 TROCHANTERIC BURSITIS OF BOTH HIPS: ICD-10-CM

## 2018-11-14 DIAGNOSIS — M53.3 SACROILIAC JOINT DYSFUNCTION OF LEFT SIDE: ICD-10-CM

## 2018-11-14 DIAGNOSIS — M70.61 TROCHANTERIC BURSITIS OF BOTH HIPS: ICD-10-CM

## 2018-11-14 DIAGNOSIS — M62.830 MUSCLE SPASM OF BACK: ICD-10-CM

## 2018-11-14 DIAGNOSIS — M54.50 CHRONIC BILATERAL LOW BACK PAIN WITHOUT SCIATICA: ICD-10-CM

## 2018-11-18 RX ORDER — HYDROCODONE BITARTRATE AND ACETAMINOPHEN 10; 325 MG/1; MG/1
1 TABLET ORAL EVERY 6 HOURS PRN
Qty: 120 TABLET | Refills: 0 | Status: SHIPPED | OUTPATIENT
Start: 2018-11-19 | End: 2018-12-17 | Stop reason: SDUPTHER

## 2018-12-17 DIAGNOSIS — M54.50 CHRONIC BILATERAL LOW BACK PAIN WITHOUT SCIATICA: ICD-10-CM

## 2018-12-17 DIAGNOSIS — M54.59 LUMBAR FACET JOINT PAIN: ICD-10-CM

## 2018-12-17 DIAGNOSIS — M70.61 TROCHANTERIC BURSITIS OF BOTH HIPS: ICD-10-CM

## 2018-12-17 DIAGNOSIS — M70.62 TROCHANTERIC BURSITIS OF BOTH HIPS: ICD-10-CM

## 2018-12-17 DIAGNOSIS — M62.830 MUSCLE SPASM OF BACK: ICD-10-CM

## 2018-12-17 DIAGNOSIS — G89.29 CHRONIC BILATERAL LOW BACK PAIN WITHOUT SCIATICA: ICD-10-CM

## 2018-12-17 DIAGNOSIS — M53.3 SACROILIAC JOINT DYSFUNCTION OF LEFT SIDE: ICD-10-CM

## 2018-12-17 RX ORDER — HYDROCODONE BITARTRATE AND ACETAMINOPHEN 10; 325 MG/1; MG/1
1 TABLET ORAL EVERY 6 HOURS PRN
Qty: 120 TABLET | Refills: 0 | Status: SHIPPED | OUTPATIENT
Start: 2018-12-19 | End: 2019-01-18

## 2019-01-02 ENCOUNTER — HOSPITAL ENCOUNTER (OUTPATIENT)
Dept: PAIN MANAGEMENT | Age: 64
Discharge: HOME | End: 2019-01-02
Payer: MEDICAID

## 2019-01-02 VITALS
BODY MASS INDEX: 40.09 KG/M2 | TEMPERATURE: 98.4 F | HEART RATE: 94 BPM | HEIGHT: 68 IN | RESPIRATION RATE: 20 BRPM | OXYGEN SATURATION: 93 % | SYSTOLIC BLOOD PRESSURE: 181 MMHG | WEIGHT: 264.5 LBS | DIASTOLIC BLOOD PRESSURE: 83 MMHG

## 2019-01-02 DIAGNOSIS — M54.50 CHRONIC BILATERAL LOW BACK PAIN WITHOUT SCIATICA: ICD-10-CM

## 2019-01-02 DIAGNOSIS — M70.61 TROCHANTERIC BURSITIS OF BOTH HIPS: ICD-10-CM

## 2019-01-02 DIAGNOSIS — M70.62 TROCHANTERIC BURSITIS OF BOTH HIPS: ICD-10-CM

## 2019-01-02 DIAGNOSIS — M54.59 LUMBAR FACET JOINT PAIN: ICD-10-CM

## 2019-01-02 DIAGNOSIS — G89.29 CHRONIC BILATERAL LOW BACK PAIN WITHOUT SCIATICA: ICD-10-CM

## 2019-01-02 DIAGNOSIS — M53.3 SACROILIAC JOINT DYSFUNCTION OF LEFT SIDE: ICD-10-CM

## 2019-01-02 DIAGNOSIS — M62.830 MUSCLE SPASM OF BACK: ICD-10-CM

## 2019-01-02 PROCEDURE — 99212 OFFICE O/P EST SF 10 MIN: CPT

## 2019-01-02 PROCEDURE — 99213 OFFICE O/P EST LOW 20 MIN: CPT | Performed by: NURSE PRACTITIONER

## 2019-01-02 RX ORDER — HYDROCODONE BITARTRATE AND ACETAMINOPHEN 10; 325 MG/1; MG/1
1 TABLET ORAL EVERY 6 HOURS PRN
Qty: 120 TABLET | Refills: 0 | Status: SHIPPED | OUTPATIENT
Start: 2019-01-18 | End: 2019-02-13 | Stop reason: SDUPTHER

## 2019-01-02 ASSESSMENT — PAIN SCALES - GENERAL: PAINLEVEL_OUTOF10: 3

## 2019-01-02 ASSESSMENT — PAIN DESCRIPTION - PROGRESSION: CLINICAL_PROGRESSION: NOT CHANGED

## 2019-01-02 ASSESSMENT — PAIN DESCRIPTION - ONSET: ONSET: ON-GOING

## 2019-01-02 ASSESSMENT — PAIN DESCRIPTION - LOCATION: LOCATION: BACK

## 2019-01-02 ASSESSMENT — PAIN DESCRIPTION - DESCRIPTORS: DESCRIPTORS: ACHING;NUMBNESS;CONSTANT

## 2019-01-02 ASSESSMENT — PAIN DESCRIPTION - ORIENTATION: ORIENTATION: LOWER

## 2019-01-02 ASSESSMENT — ACTIVITIES OF DAILY LIVING (ADL): EFFECT OF PAIN ON DAILY ACTIVITIES: LIMITS ACTIVITIES

## 2019-01-02 ASSESSMENT — PAIN DESCRIPTION - DIRECTION: RADIATING_TOWARDS: BILATERAL HIPS

## 2019-01-02 ASSESSMENT — PAIN DESCRIPTION - FREQUENCY: FREQUENCY: CONTINUOUS

## 2019-01-02 ASSESSMENT — PAIN DESCRIPTION - PAIN TYPE: TYPE: CHRONIC PAIN

## 2019-02-13 DIAGNOSIS — M70.61 TROCHANTERIC BURSITIS OF BOTH HIPS: ICD-10-CM

## 2019-02-13 DIAGNOSIS — G89.29 CHRONIC BILATERAL LOW BACK PAIN WITHOUT SCIATICA: ICD-10-CM

## 2019-02-13 DIAGNOSIS — M62.830 MUSCLE SPASM OF BACK: ICD-10-CM

## 2019-02-13 DIAGNOSIS — M70.62 TROCHANTERIC BURSITIS OF BOTH HIPS: ICD-10-CM

## 2019-02-13 DIAGNOSIS — M54.59 LUMBAR FACET JOINT PAIN: ICD-10-CM

## 2019-02-13 DIAGNOSIS — M53.3 SACROILIAC JOINT DYSFUNCTION OF LEFT SIDE: ICD-10-CM

## 2019-02-13 DIAGNOSIS — M54.50 CHRONIC BILATERAL LOW BACK PAIN WITHOUT SCIATICA: ICD-10-CM

## 2019-02-13 RX ORDER — HYDROCODONE BITARTRATE AND ACETAMINOPHEN 10; 325 MG/1; MG/1
1 TABLET ORAL EVERY 6 HOURS PRN
Qty: 120 TABLET | Refills: 0 | Status: SHIPPED | OUTPATIENT
Start: 2019-02-17 | End: 2019-03-13 | Stop reason: SDUPTHER

## 2019-03-13 DIAGNOSIS — G89.29 CHRONIC BILATERAL LOW BACK PAIN WITHOUT SCIATICA: ICD-10-CM

## 2019-03-13 DIAGNOSIS — M54.59 LUMBAR FACET JOINT PAIN: ICD-10-CM

## 2019-03-13 DIAGNOSIS — M54.50 CHRONIC BILATERAL LOW BACK PAIN WITHOUT SCIATICA: ICD-10-CM

## 2019-03-13 RX ORDER — HYDROCODONE BITARTRATE AND ACETAMINOPHEN 10; 325 MG/1; MG/1
1 TABLET ORAL EVERY 6 HOURS PRN
Qty: 120 TABLET | Refills: 0 | Status: SHIPPED | OUTPATIENT
Start: 2019-03-19 | End: 2019-04-18

## 2019-04-01 ENCOUNTER — HOSPITAL ENCOUNTER (OUTPATIENT)
Dept: PAIN MANAGEMENT | Age: 64
Discharge: HOME | End: 2019-04-01
Payer: MEDICAID

## 2019-04-01 VITALS
HEART RATE: 92 BPM | RESPIRATION RATE: 20 BRPM | TEMPERATURE: 99.1 F | HEIGHT: 68 IN | OXYGEN SATURATION: 95 % | SYSTOLIC BLOOD PRESSURE: 193 MMHG | DIASTOLIC BLOOD PRESSURE: 95 MMHG | WEIGHT: 264.5 LBS | BODY MASS INDEX: 40.09 KG/M2

## 2019-04-01 DIAGNOSIS — M70.62 TROCHANTERIC BURSITIS OF BOTH HIPS: ICD-10-CM

## 2019-04-01 DIAGNOSIS — M53.3 SACROILIAC JOINT DYSFUNCTION OF LEFT SIDE: ICD-10-CM

## 2019-04-01 DIAGNOSIS — G89.29 CHRONIC BILATERAL LOW BACK PAIN WITHOUT SCIATICA: ICD-10-CM

## 2019-04-01 DIAGNOSIS — M54.59 LUMBAR FACET JOINT PAIN: ICD-10-CM

## 2019-04-01 DIAGNOSIS — M70.61 TROCHANTERIC BURSITIS OF BOTH HIPS: ICD-10-CM

## 2019-04-01 DIAGNOSIS — M54.50 CHRONIC BILATERAL LOW BACK PAIN WITHOUT SCIATICA: ICD-10-CM

## 2019-04-01 DIAGNOSIS — M62.830 MUSCLE SPASM OF BACK: ICD-10-CM

## 2019-04-01 PROCEDURE — 99214 OFFICE O/P EST MOD 30 MIN: CPT | Performed by: NURSE PRACTITIONER

## 2019-04-01 PROCEDURE — 99212 OFFICE O/P EST SF 10 MIN: CPT

## 2019-04-01 RX ORDER — HYDROCODONE BITARTRATE AND ACETAMINOPHEN 10; 325 MG/1; MG/1
1 TABLET ORAL EVERY 6 HOURS PRN
Qty: 120 TABLET | Refills: 0 | Status: SHIPPED | OUTPATIENT
Start: 2019-04-17 | End: 2019-05-14 | Stop reason: SDUPTHER

## 2019-04-01 RX ORDER — METHOCARBAMOL 750 MG/1
750 TABLET, FILM COATED ORAL 3 TIMES DAILY
Qty: 90 TABLET | Refills: 0 | Status: SHIPPED | OUTPATIENT
Start: 2019-04-01 | End: 2019-05-01

## 2019-04-01 ASSESSMENT — PAIN SCALES - GENERAL: PAINLEVEL_OUTOF10: 3

## 2019-04-01 ASSESSMENT — PAIN DESCRIPTION - PAIN TYPE: TYPE: CHRONIC PAIN

## 2019-04-01 ASSESSMENT — PAIN DESCRIPTION - FREQUENCY: FREQUENCY: CONTINUOUS

## 2019-04-01 NOTE — PROGRESS NOTES
Nursing Admission Record    Current Issues / Falls / ER Visits:  Yes was sick yesterday no treatment sought resolved on its own    Percentage of Pain Relief after Last Procedure:  n/a %    How long lasted:  0 days    Radiology exams received during the last 12 months: Yes       When 2 weeks ago  On shoulder                                              Where primary care       Imaging on chart: No         Imaging records requested: No  MRI exams received in the past 2 years:  Yes  Physical therapy during the last 6 months: No       When:                                              Where   Labs during the last 12 months: No                                                                         PEG Score    1. What number best describes your pain on average in the past week? 0           1          2         3         4        5        6         7       8         9     10   No Pain                                                                                               Pain as bad as you can imagine              Score______4_____    2. What number best describes how, during the past week, pain has interfered with your enjoyment of life? 0           1           2         3          4       5        6        7        8         9      10   Does not                                                                                              Completely     interfere                                                                                                 interferes            Score_____8______    3. What number best describes how, during the past week, pain has interfered with your general activity?        0     1               2           3          4         5        6        7       8         9       10   Does not                                                                                               Completely    interfere interferes           Score_______8____                                                                                Total Score ÷ 3 = Score                                                                                                 Score______7__                                                                                              Education Provided:  [x] Review of Ariana Heck  [] Agreement Review  [] Compliance Issues Discussed    [] Cognitive Behavior Needs [] Exercise [] Review of Test [] Financial Issues  [] Tobacco/Alcohol Use [x] Teaching [] New Patient [] Picture Obtained    Physician Plan:  [] Outgoing Referral  [] Pharmacy Consult  [] Test Ordered   [] Obtained Test Results / Consult Notes  [] UDS due at next visit, verified per EPIC      [] Suspected Physical Abuse or Suicide Risk assessed - IF YES COMPLETE QUESTIONS BELOW    If any of the following questions are answered yes - contact attending physician for referral:    Has been considering harming self to escape stress, pain problems? [] YES  [] NO  Has a suicide plan? [] YES  [] NO  Has attempted suicide in the past?   [] YES  [] NO  Has a close friend or family member who committed suicide? [] YES  [] NO    Patient Referred To :      Additional Notes:    Assessment Completed by:  Electronically signed by Nickolas Alexis RN on 4/1/2019 at 9:25 AM

## 2019-04-01 NOTE — PROGRESS NOTES
[address] [place] [address] Pain Medicine    Office Progress Note    Patient Name: Deborah Figueroa    MR #: [id number]    Account #: [de-identified]    : 1955    Age: 59 y.o. Sex: male    Date: 2019    PCP: Orin Foreman DO         Referring Provider:    Chief Complaint: No chief complaint on file. History of Present Illness:    Deborah Figueroa is a 59 y.o. male who presents to the office for follow-up of Low back pain. He says that he has been having muscle spasms in his low back. He has been on Robaxin in the past and it seemed to help. He continues to take his Norco.     Employment: Retired [__]  Disabled [__]  Works [__]  Does Not Work [x__]    Previous Injury:  Yes [__]  No [x__]    Previous Surgery: Yes [__]  No [_x_]    Previous Physical Therapy In the last 6 months? Yes [__]    No [_x_]  Did Physical Therapy make thepain better or worse? Better [__]   Worse [__]  Unchanged [__]    MRI or X-ray in the two last years? Yes [__]   No [_x_]  Results reviewed with patient? Yes [__]   No [__]   Found under Image or Media Tab    Injections in the past?  Yes [__]   No [_x_]  Did the injections help relieve the pain? Yes [__]   No [__]    Do you have Depression? Yes [__]    No [_x_]  Thinking of harming yourself or others?   Yes [__]   No [_x_]    Past Medical Histoy  Past Medical History:   Diagnosis Date    Carotid artery stenosis     Cellulitis     Chest pain     Chronic respiratory disease     Claudication (HCC)     COPD (chronic obstructive pulmonary disease) (HCC)     Depression     Essential hypertension     History of measles     Hyperlipidemia     Kyphosis     PVD (peripheral vascular disease) (HCC)     Shortness of breath     Smoker        Surgery History  Past Surgical History:   Procedure Laterality Date    CAROTID ENDARTERECTOMY      COLONOSCOPY      OTHER SURGICAL HISTORY  2012 [facility]    Aortogram and runoff, Interventional procedure was attempted to denies fever, chills, fatigue, change in appetite, weight gain or weight loss  Head: Normocephalic  Skin: denies easy bruising, skin redness, skin rash, hives, sensitivity to sun exposure, tightness, nodules or bumps, hair loss, color changes in the hands or feet with cold. Eyes: denies pain, redness, loss of vision, double or blurred vision, eye drainage, or dryness. ENT and Mouth: denies ringing in the ears, loss of hearing, nasal congestion, nasal discharge, no hoarseness, sore throat, or difficulty swallowing   Respiratory: denies chronic dry cough, coughing up blood, coughing up mucus, waking at night coughing or choking, or wheezing. Cardiovascular: denies chest pain, irregular heartbeats, palpitations, shortness of breath, or edema in legs  Gastrointestinal: denies, nausea, vomiting, heartburn, diarrhea, or constipation  Genitourinary: denies difficult urination, pain or burning with urination, blood in the urine, or cloudy urine  Musculoskeletal: denies arm, buttock, thigh or calf cramps. Has pain in low back, muscle spasms in low back and tenderness in low back. No muscle weakness. No joint swelling. Neurologic: headache, dizziness, fainting, loss of consciousness, no memory loss. no sensitivity. Endocrine: denies intolerance to hot or cold temperature, night sweats, flushing, fingernail changes, increased thirst, or hairloss   Hematologic/ Lymphatic: denies anemia, bleeding tendency or clotting tendency, bruising easily. Allergic/ Immunologic: denies rhinitis, asthma, skin sensitivity, or allergy to Latex   Psychiatric: denies depression or thoughts of suicide, or voices in head.        Current Pain Assessment:   Pain Assessment  Pain Assessment: 0-10  Pain Level: 3  Pain Type: Chronic pain  Pain Frequency: Continuous    Clinical Progression: gradually improving  Effect of Pain on Daily Activities: limits activity  Patient's Stated Pain Goal: No pain  Pain Intervention(s): Medication (see eMar), Repositioning, Rest, Ice  Response to Pain Intervention: Patient Satisfied  Multiple Pain Sites: No  POSS Score (Patient Ctrl Analgesia): 1     Physical Exam:    Vitals:    04/01/19 0919   BP: (!) 193/95   Pulse: 92   Resp: 20   Temp: 99.1 °F (37.3 °C)   TempSrc: Tympanic   SpO2: 95%   Weight: 264 lb 8 oz (120 kg)   Height: 5' 8\" (1.727 m)       Body mass index is 40.22 kg/m². General Appearance: no acute distress. Appears to be well dressed  Skin Exam: Warm and dry, no jaundice, rashes or lesions  Head Exam: NCAT, PERRLA, EOMI, moist mucus membranes, scalp normal  Neck Exam: Supple, no masses, trachea midline  Lung: Clear to ausculation in all lobes anterior and posterior. Heart[de-identified] Regular rate and rhythm, no gallops, rubs or murmurs, no edema  Abdomen: Bowel sounds in all quadrants, soft, non-distended, non-tender with palpation, no guarding  Extremities: No rash, cyanosis or bruising  Musculoskeletal: No joint swelling or deformity   Back Exam: pain in lumbar spine with flexion  Neck Exam: Trachea midline. No masses palpated.    Hip Exam: Full rotation bilateral   Knee Exam: Full flexion and extension bilateral, no crepitus  Shoulder Exam: Full ROM bilateral  Neurologic Exam: Gait and coordination normal, speech normal  Reflexes: Normal brachialis, Negative Sorensen's bilateral. Normal Patellar bilateral,   CN EXAM: II-XII intact, face symmetrical, tongue symmetrical, the trapezius and sternocleidomastoid muscle appearance and strength symmetrical, normal achilles bilateral, ankle clonus negative bilateral  Strength: 5/5 RUE Bi's/Tri's, 5/5 LUE Bi's/Tri's, 5/5 RLE knee flex/ext, 5/5 RLE DF/PF, 5/5 LLE knee flex/ext, 5/5 LLE DF/PF  Sensation: Equal and intact to fine touch in all extremities  Mood and affect: Normal   Nurses note reviewed along with current vital signs    Active Problem(s)  Principal Problem:    Chronic bilateral low back pain without sciatica  Active Problems:    Lumbar facet joint pain Muscle spasm of back  Resolved Problems:    * No resolved hospital problems. *                                                                                                                            PLAN:  1. Patient is to call the office with any questions or concerns that may arise prior to next appointment. 2. Continue Norco  3. Robaxin 750 mg TID prn  4. Follow-up in 3 months    Urine Drug Screen Today:  Yes [__]  No [_x_]    Discussion:  Discussed exam findings and plan of care with patient. Patient agreed with the current plan of care at this time. All questions from the patient were answered by the provider. Activity:   Discussed exercise as beneficial to pain reduction, encouraged stretching exercise with a focus on torso strengthening. Education Provided:  Review of Oz Zara [x]  Agreement Review  [x]  Reviewed PHQ-9  [x]      Reviewed ORT [x]  Review of Test  []  Compliance Issues Discussed []   Cognitive Behavior Needs  []  Exercise  [x]  Financial Issues  []   Tobacco/Alcohol Use  []  Teaching  [x]   New Patient Picture Obtained  [x]    [__] Benzodiazapine's and Narcotics:  Patient educated on the possible effects of combining Benzodiazapine's and Opioids. Explained \"Black Box Warnings\" such as; possible suppressed breathing, hypoxia, anoxia, depressed cognition, heart arrhythmia, coma and possible death. Patient verbalized understanding concerning possible effects. Controlled Substance Monitoring:   Discussed with patient possible medication side effects, risk of tolerance, dependence and alternative treatments. Discussed thegrowing epidemic in the [place] with the overprescribing and at times the abuse of narcotics. Discussed the detrimental effects of long term narcotic use. Patient encouraged to set daily goals of exercising and decreasing dailynarcotic intake. Discussed with the patient about the development of hyperalgesia with long term narcotic intake.       CC:  Lorri Salgado, DO    1 [facility], [facility], 4/1/2019 at 9:47 AM

## 2019-04-05 ENCOUNTER — HOSPITAL ENCOUNTER (OUTPATIENT)
Facility: HOSPITAL | Age: 64
Discharge: HOME | End: 2019-04-06
Attending: EMERGENCY MEDICINE | Admitting: INTERNAL MEDICINE

## 2019-04-05 DIAGNOSIS — I20.0 UNSTABLE ANGINA (HCC): ICD-10-CM

## 2019-04-05 DIAGNOSIS — I24.9 ACS (ACUTE CORONARY SYNDROME) (HCC): Primary | ICD-10-CM

## 2019-04-05 LAB
ANION GAP SERPL CALCULATED.3IONS-SCNC: 8 MMOL/L (ref 4–13)
BUN BLD-MCNC: 15 MG/DL (ref 5–21)
BUN/CREAT SERPL: 17.6 (ref 7–25)
CALCIUM SPEC-SCNC: 8.9 MG/DL (ref 8.4–10.4)
CHLORIDE SERPL-SCNC: 99 MMOL/L (ref 98–110)
CO2 SERPL-SCNC: 31 MMOL/L (ref 24–31)
CREAT BLD-MCNC: 0.85 MG/DL (ref 0.5–1.4)
GFR SERPL CREATININE-BSD FRML MDRD: 91 ML/MIN/1.73
GLUCOSE BLD-MCNC: 149 MG/DL (ref 70–100)
HOLD SPECIMEN: NORMAL
HOLD SPECIMEN: NORMAL
INR PPP: 0.97 (ref 0.91–1.09)
POTASSIUM BLD-SCNC: 4.7 MMOL/L (ref 3.5–5.3)
PROTHROMBIN TIME: 13.2 SECONDS (ref 11.9–14.6)
SODIUM BLD-SCNC: 138 MMOL/L (ref 135–145)
TROPONIN I SERPL-MCNC: <0.012 NG/ML (ref 0–0.03)
WHOLE BLOOD HOLD SPECIMEN: NORMAL
WHOLE BLOOD HOLD SPECIMEN: NORMAL

## 2019-04-05 PROCEDURE — 93005 ELECTROCARDIOGRAM TRACING: CPT | Performed by: EMERGENCY MEDICINE

## 2019-04-05 PROCEDURE — 25010000002 METHYLPREDNISOLONE PER 125 MG: Performed by: INTERNAL MEDICINE

## 2019-04-05 PROCEDURE — C1894 INTRO/SHEATH, NON-LASER: HCPCS | Performed by: INTERNAL MEDICINE

## 2019-04-05 PROCEDURE — C1769 GUIDE WIRE: HCPCS | Performed by: INTERNAL MEDICINE

## 2019-04-05 PROCEDURE — G0378 HOSPITAL OBSERVATION PER HR: HCPCS

## 2019-04-05 PROCEDURE — 94640 AIRWAY INHALATION TREATMENT: CPT

## 2019-04-05 PROCEDURE — 75630 X-RAY AORTA LEG ARTERIES: CPT | Performed by: INTERNAL MEDICINE

## 2019-04-05 PROCEDURE — 84484 ASSAY OF TROPONIN QUANT: CPT | Performed by: EMERGENCY MEDICINE

## 2019-04-05 PROCEDURE — 25010000002 MIDAZOLAM PER 1 MG: Performed by: INTERNAL MEDICINE

## 2019-04-05 PROCEDURE — 93005 ELECTROCARDIOGRAM TRACING: CPT | Performed by: INTERNAL MEDICINE

## 2019-04-05 PROCEDURE — 93458 L HRT ARTERY/VENTRICLE ANGIO: CPT | Performed by: INTERNAL MEDICINE

## 2019-04-05 PROCEDURE — 93010 ELECTROCARDIOGRAM REPORT: CPT | Performed by: INTERNAL MEDICINE

## 2019-04-05 PROCEDURE — 75710 ARTERY X-RAYS ARM/LEG: CPT | Performed by: INTERNAL MEDICINE

## 2019-04-05 PROCEDURE — 25010000002 ENOXAPARIN PER 10 MG: Performed by: PHYSICIAN ASSISTANT

## 2019-04-05 PROCEDURE — C1725 CATH, TRANSLUMIN NON-LASER: HCPCS | Performed by: INTERNAL MEDICINE

## 2019-04-05 PROCEDURE — 37220 PR REVASCULARIZATION ILIAC ARTERY ANGIOP 1ST VSL: CPT | Performed by: INTERNAL MEDICINE

## 2019-04-05 PROCEDURE — 94760 N-INVAS EAR/PLS OXIMETRY 1: CPT

## 2019-04-05 PROCEDURE — 94799 UNLISTED PULMONARY SVC/PX: CPT

## 2019-04-05 PROCEDURE — 25010000002 DIPHENHYDRAMINE PER 50 MG: Performed by: INTERNAL MEDICINE

## 2019-04-05 PROCEDURE — 99285 EMERGENCY DEPT VISIT HI MDM: CPT

## 2019-04-05 PROCEDURE — 80048 BASIC METABOLIC PNL TOTAL CA: CPT | Performed by: INTERNAL MEDICINE

## 2019-04-05 PROCEDURE — 25010000002 HEPARIN (PORCINE) PER 1000 UNITS: Performed by: INTERNAL MEDICINE

## 2019-04-05 PROCEDURE — 96372 THER/PROPH/DIAG INJ SC/IM: CPT

## 2019-04-05 PROCEDURE — 75625 CONTRAST EXAM ABDOMINL AORTA: CPT | Performed by: INTERNAL MEDICINE

## 2019-04-05 PROCEDURE — 99220 PR INITIAL OBSERVATION CARE/DAY 70 MINUTES: CPT | Performed by: INTERNAL MEDICINE

## 2019-04-05 PROCEDURE — 85610 PROTHROMBIN TIME: CPT | Performed by: INTERNAL MEDICINE

## 2019-04-05 PROCEDURE — 25010000002 IOPAMIDOL 61 % SOLUTION: Performed by: INTERNAL MEDICINE

## 2019-04-05 PROCEDURE — 99152 MOD SED SAME PHYS/QHP 5/>YRS: CPT | Performed by: INTERNAL MEDICINE

## 2019-04-05 PROCEDURE — C1760 CLOSURE DEV, VASC: HCPCS | Performed by: INTERNAL MEDICINE

## 2019-04-05 PROCEDURE — 25010000002 FENTANYL CITRATE (PF) 100 MCG/2ML SOLUTION: Performed by: INTERNAL MEDICINE

## 2019-04-05 RX ORDER — ONDANSETRON 2 MG/ML
4 INJECTION INTRAMUSCULAR; INTRAVENOUS EVERY 6 HOURS PRN
Status: DISCONTINUED | OUTPATIENT
Start: 2019-04-05 | End: 2019-04-06 | Stop reason: HOSPADM

## 2019-04-05 RX ORDER — BUDESONIDE AND FORMOTEROL FUMARATE DIHYDRATE 160; 4.5 UG/1; UG/1
2 AEROSOL RESPIRATORY (INHALATION)
Status: DISCONTINUED | OUTPATIENT
Start: 2019-04-05 | End: 2019-04-06 | Stop reason: HOSPADM

## 2019-04-05 RX ORDER — CETIRIZINE HYDROCHLORIDE 10 MG/1
10 TABLET ORAL NIGHTLY
Status: DISCONTINUED | OUTPATIENT
Start: 2019-04-05 | End: 2019-04-06 | Stop reason: HOSPADM

## 2019-04-05 RX ORDER — ASPIRIN 81 MG/1
81 TABLET ORAL DAILY
Status: DISCONTINUED | OUTPATIENT
Start: 2019-04-05 | End: 2019-04-06 | Stop reason: HOSPADM

## 2019-04-05 RX ORDER — LIDOCAINE HYDROCHLORIDE 20 MG/ML
INJECTION, SOLUTION INFILTRATION; PERINEURAL AS NEEDED
Status: DISCONTINUED | OUTPATIENT
Start: 2019-04-05 | End: 2019-04-05 | Stop reason: HOSPADM

## 2019-04-05 RX ORDER — FENTANYL CITRATE 50 UG/ML
INJECTION, SOLUTION INTRAMUSCULAR; INTRAVENOUS AS NEEDED
Status: DISCONTINUED | OUTPATIENT
Start: 2019-04-05 | End: 2019-04-05 | Stop reason: HOSPADM

## 2019-04-05 RX ORDER — FAMOTIDINE 10 MG/ML
20 INJECTION, SOLUTION INTRAVENOUS EVERY 12 HOURS SCHEDULED
Status: DISCONTINUED | OUTPATIENT
Start: 2019-04-05 | End: 2019-04-06 | Stop reason: HOSPADM

## 2019-04-05 RX ORDER — ACETAMINOPHEN 325 MG/1
650 TABLET ORAL EVERY 4 HOURS PRN
Status: DISCONTINUED | OUTPATIENT
Start: 2019-04-05 | End: 2019-04-06 | Stop reason: HOSPADM

## 2019-04-05 RX ORDER — CARVEDILOL 3.12 MG/1
3.12 TABLET ORAL 2 TIMES DAILY WITH MEALS
Status: DISCONTINUED | OUTPATIENT
Start: 2019-04-05 | End: 2019-04-06

## 2019-04-05 RX ORDER — SODIUM CHLORIDE 0.9 % (FLUSH) 0.9 %
3 SYRINGE (ML) INJECTION EVERY 12 HOURS SCHEDULED
Status: DISCONTINUED | OUTPATIENT
Start: 2019-04-05 | End: 2019-04-06 | Stop reason: HOSPADM

## 2019-04-05 RX ORDER — DIPHENHYDRAMINE HYDROCHLORIDE 50 MG/ML
INJECTION INTRAMUSCULAR; INTRAVENOUS AS NEEDED
Status: DISCONTINUED | OUTPATIENT
Start: 2019-04-05 | End: 2019-04-05 | Stop reason: HOSPADM

## 2019-04-05 RX ORDER — FLUTICASONE PROPIONATE 50 MCG
2 SPRAY, SUSPENSION (ML) NASAL DAILY
Status: DISCONTINUED | OUTPATIENT
Start: 2019-04-05 | End: 2019-04-06 | Stop reason: HOSPADM

## 2019-04-05 RX ORDER — MIDAZOLAM HYDROCHLORIDE 1 MG/ML
INJECTION INTRAMUSCULAR; INTRAVENOUS AS NEEDED
Status: DISCONTINUED | OUTPATIENT
Start: 2019-04-05 | End: 2019-04-05 | Stop reason: HOSPADM

## 2019-04-05 RX ORDER — DULOXETIN HYDROCHLORIDE 30 MG/1
60 CAPSULE, DELAYED RELEASE ORAL DAILY
Status: DISCONTINUED | OUTPATIENT
Start: 2019-04-05 | End: 2019-04-06 | Stop reason: HOSPADM

## 2019-04-05 RX ORDER — SODIUM CHLORIDE 0.9 % (FLUSH) 0.9 %
3-10 SYRINGE (ML) INJECTION AS NEEDED
Status: DISCONTINUED | OUTPATIENT
Start: 2019-04-05 | End: 2019-04-06 | Stop reason: HOSPADM

## 2019-04-05 RX ORDER — SODIUM CHLORIDE 9 MG/ML
1-3 INJECTION, SOLUTION INTRAVENOUS CONTINUOUS
Status: DISCONTINUED | OUTPATIENT
Start: 2019-04-05 | End: 2019-04-06 | Stop reason: HOSPADM

## 2019-04-05 RX ORDER — CLOPIDOGREL BISULFATE 75 MG/1
75 TABLET ORAL EVERY EVENING
Status: DISCONTINUED | OUTPATIENT
Start: 2019-04-05 | End: 2019-04-06 | Stop reason: HOSPADM

## 2019-04-05 RX ORDER — ATORVASTATIN CALCIUM 40 MG/1
40 TABLET, FILM COATED ORAL NIGHTLY
Status: DISCONTINUED | OUTPATIENT
Start: 2019-04-05 | End: 2019-04-06 | Stop reason: HOSPADM

## 2019-04-05 RX ORDER — IPRATROPIUM BROMIDE AND ALBUTEROL SULFATE 2.5; .5 MG/3ML; MG/3ML
3 SOLUTION RESPIRATORY (INHALATION) EVERY 4 HOURS PRN
Status: DISCONTINUED | OUTPATIENT
Start: 2019-04-05 | End: 2019-04-06 | Stop reason: HOSPADM

## 2019-04-05 RX ORDER — METHYLPREDNISOLONE SODIUM SUCCINATE 125 MG/2ML
INJECTION, POWDER, LYOPHILIZED, FOR SOLUTION INTRAMUSCULAR; INTRAVENOUS AS NEEDED
Status: DISCONTINUED | OUTPATIENT
Start: 2019-04-05 | End: 2019-04-05 | Stop reason: HOSPADM

## 2019-04-05 RX ORDER — SODIUM CHLORIDE 9 MG/ML
100 INJECTION, SOLUTION INTRAVENOUS CONTINUOUS
Status: DISCONTINUED | OUTPATIENT
Start: 2019-04-05 | End: 2019-04-06 | Stop reason: HOSPADM

## 2019-04-05 RX ORDER — HYDROCODONE BITARTRATE AND ACETAMINOPHEN 10; 325 MG/1; MG/1
1 TABLET ORAL EVERY 6 HOURS PRN
Status: DISCONTINUED | OUTPATIENT
Start: 2019-04-05 | End: 2019-04-06 | Stop reason: HOSPADM

## 2019-04-05 RX ADMIN — DESMOPRESSIN ACETATE 40 MG: 0.2 TABLET ORAL at 20:27

## 2019-04-05 RX ADMIN — ENOXAPARIN SODIUM 120 MG: 120 INJECTION SUBCUTANEOUS at 12:55

## 2019-04-05 RX ADMIN — CETIRIZINE HYDROCHLORIDE 10 MG: 10 TABLET, FILM COATED ORAL at 20:27

## 2019-04-05 RX ADMIN — IPRATROPIUM BROMIDE AND ALBUTEROL SULFATE 3 ML: 2.5; .5 SOLUTION RESPIRATORY (INHALATION) at 19:57

## 2019-04-05 RX ADMIN — FAMOTIDINE 20 MG: 10 INJECTION, SOLUTION INTRAVENOUS at 14:13

## 2019-04-05 RX ADMIN — BUDESONIDE AND FORMOTEROL FUMARATE DIHYDRATE 2 PUFF: 160; 4.5 AEROSOL RESPIRATORY (INHALATION) at 20:10

## 2019-04-05 RX ADMIN — SODIUM CHLORIDE 100 ML/HR: 9 INJECTION, SOLUTION INTRAVENOUS at 16:58

## 2019-04-05 RX ADMIN — FAMOTIDINE 20 MG: 10 INJECTION, SOLUTION INTRAVENOUS at 20:27

## 2019-04-05 RX ADMIN — HYDROCODONE BITARTRATE AND ACETAMINOPHEN 1 TABLET: 10; 325 TABLET ORAL at 18:18

## 2019-04-06 VITALS
RESPIRATION RATE: 20 BRPM | WEIGHT: 266.1 LBS | HEIGHT: 68 IN | HEART RATE: 90 BPM | SYSTOLIC BLOOD PRESSURE: 172 MMHG | BODY MASS INDEX: 40.33 KG/M2 | TEMPERATURE: 98.6 F | OXYGEN SATURATION: 94 % | DIASTOLIC BLOOD PRESSURE: 70 MMHG

## 2019-04-06 LAB
ANION GAP SERPL CALCULATED.3IONS-SCNC: 10 MMOL/L (ref 4–13)
ARTICHOKE IGE QN: 89 MG/DL (ref 0–99)
BUN BLD-MCNC: 19 MG/DL (ref 5–21)
BUN/CREAT SERPL: 25 (ref 7–25)
CALCIUM SPEC-SCNC: 8.8 MG/DL (ref 8.4–10.4)
CHLORIDE SERPL-SCNC: 101 MMOL/L (ref 98–110)
CHOLEST SERPL-MCNC: 139 MG/DL (ref 130–200)
CO2 SERPL-SCNC: 27 MMOL/L (ref 24–31)
CREAT BLD-MCNC: 0.76 MG/DL (ref 0.5–1.4)
DEPRECATED RDW RBC AUTO: 46.6 FL (ref 40–54)
ERYTHROCYTE [DISTWIDTH] IN BLOOD BY AUTOMATED COUNT: 13.5 % (ref 12–15)
GFR SERPL CREATININE-BSD FRML MDRD: 104 ML/MIN/1.73
GLUCOSE BLD-MCNC: 236 MG/DL (ref 70–100)
HCT VFR BLD AUTO: 48.1 % (ref 40–52)
HDLC SERPL-MCNC: 42 MG/DL
HGB BLD-MCNC: 16.1 G/DL (ref 14–18)
LDLC/HDLC SERPL: 1.65 {RATIO}
MCH RBC QN AUTO: 31.1 PG (ref 28–32)
MCHC RBC AUTO-ENTMCNC: 33.5 G/DL (ref 33–36)
MCV RBC AUTO: 93 FL (ref 82–95)
PLATELET # BLD AUTO: 264 10*3/MM3 (ref 130–400)
PMV BLD AUTO: 8.8 FL (ref 6–12)
POTASSIUM BLD-SCNC: 4.3 MMOL/L (ref 3.5–5.3)
RBC # BLD AUTO: 5.17 10*6/MM3 (ref 4.8–5.9)
SODIUM BLD-SCNC: 138 MMOL/L (ref 135–145)
TRIGL SERPL-MCNC: 139 MG/DL (ref 0–149)
WBC NRBC COR # BLD: 13.93 10*3/MM3 (ref 4.8–10.8)

## 2019-04-06 PROCEDURE — G0378 HOSPITAL OBSERVATION PER HR: HCPCS

## 2019-04-06 PROCEDURE — 85027 COMPLETE CBC AUTOMATED: CPT | Performed by: INTERNAL MEDICINE

## 2019-04-06 PROCEDURE — 94799 UNLISTED PULMONARY SVC/PX: CPT

## 2019-04-06 PROCEDURE — 80048 BASIC METABOLIC PNL TOTAL CA: CPT | Performed by: INTERNAL MEDICINE

## 2019-04-06 PROCEDURE — 94760 N-INVAS EAR/PLS OXIMETRY 1: CPT

## 2019-04-06 PROCEDURE — 80061 LIPID PANEL: CPT | Performed by: INTERNAL MEDICINE

## 2019-04-06 PROCEDURE — 99217 PR OBSERVATION CARE DISCHARGE MANAGEMENT: CPT | Performed by: INTERNAL MEDICINE

## 2019-04-06 RX ORDER — CARVEDILOL 6.25 MG/1
6.25 TABLET ORAL 2 TIMES DAILY WITH MEALS
Qty: 60 TABLET | Refills: 11 | Status: SHIPPED | OUTPATIENT
Start: 2019-04-06

## 2019-04-06 RX ORDER — DOXYCYCLINE 100 MG/1
100 TABLET ORAL EVERY 12 HOURS SCHEDULED
Qty: 14 TABLET | Refills: 0 | Status: SHIPPED | OUTPATIENT
Start: 2019-04-06 | End: 2019-04-13

## 2019-04-06 RX ORDER — PREDNISONE 20 MG/1
40 TABLET ORAL
Qty: 5 TABLET | Refills: 0 | Status: SHIPPED | OUTPATIENT
Start: 2019-04-06 | End: 2019-04-11

## 2019-04-06 RX ORDER — AMLODIPINE BESYLATE 5 MG/1
5 TABLET ORAL
Status: DISCONTINUED | OUTPATIENT
Start: 2019-04-06 | End: 2019-04-06 | Stop reason: HOSPADM

## 2019-04-06 RX ORDER — DOXYCYCLINE 100 MG/1
100 TABLET ORAL EVERY 12 HOURS SCHEDULED
Status: DISCONTINUED | OUTPATIENT
Start: 2019-04-06 | End: 2019-04-06 | Stop reason: HOSPADM

## 2019-04-06 RX ORDER — AMLODIPINE BESYLATE 5 MG/1
5 TABLET ORAL
Qty: 30 TABLET | Refills: 11 | Status: SHIPPED | OUTPATIENT
Start: 2019-04-06

## 2019-04-06 RX ORDER — CARVEDILOL 6.25 MG/1
6.25 TABLET ORAL 2 TIMES DAILY WITH MEALS
Status: DISCONTINUED | OUTPATIENT
Start: 2019-04-06 | End: 2019-04-06

## 2019-04-06 RX ORDER — PREDNISONE 20 MG/1
40 TABLET ORAL
Status: DISCONTINUED | OUTPATIENT
Start: 2019-04-06 | End: 2019-04-06 | Stop reason: HOSPADM

## 2019-04-06 RX ORDER — CARVEDILOL 6.25 MG/1
6.25 TABLET ORAL 2 TIMES DAILY WITH MEALS
Status: DISCONTINUED | OUTPATIENT
Start: 2019-04-06 | End: 2019-04-06 | Stop reason: HOSPADM

## 2019-04-06 RX ORDER — HYDROCODONE BITARTRATE AND ACETAMINOPHEN 10; 325 MG/1; MG/1
1 TABLET ORAL ONCE
Status: COMPLETED | OUTPATIENT
Start: 2019-04-06 | End: 2019-04-06

## 2019-04-06 RX ADMIN — HYDROCODONE BITARTRATE AND ACETAMINOPHEN 1 TABLET: 10; 325 TABLET ORAL at 11:11

## 2019-04-06 RX ADMIN — DULOXETINE HYDROCHLORIDE 60 MG: 30 CAPSULE, DELAYED RELEASE ORAL at 09:13

## 2019-04-06 RX ADMIN — LOSARTAN POTASSIUM: 50 TABLET, FILM COATED ORAL at 09:13

## 2019-04-06 RX ADMIN — CARVEDILOL 6.25 MG: 3.12 TABLET, FILM COATED ORAL at 09:17

## 2019-04-06 RX ADMIN — BUDESONIDE AND FORMOTEROL FUMARATE DIHYDRATE 2 PUFF: 160; 4.5 AEROSOL RESPIRATORY (INHALATION) at 07:27

## 2019-04-06 RX ADMIN — HYDROCODONE BITARTRATE AND ACETAMINOPHEN 1 TABLET: 10; 325 TABLET ORAL at 06:20

## 2019-04-06 RX ADMIN — ASPIRIN 81 MG: 81 TABLET, DELAYED RELEASE ORAL at 09:13

## 2019-04-06 RX ADMIN — FLUTICASONE PROPIONATE 2 SPRAY: 50 SPRAY, METERED NASAL at 09:13

## 2019-05-14 DIAGNOSIS — M54.59 LUMBAR FACET JOINT PAIN: ICD-10-CM

## 2019-05-14 RX ORDER — HYDROCODONE BITARTRATE AND ACETAMINOPHEN 10; 325 MG/1; MG/1
1 TABLET ORAL EVERY 6 HOURS PRN
Qty: 120 TABLET | Refills: 0 | Status: SHIPPED | OUTPATIENT
Start: 2019-05-17 | End: 2019-06-12 | Stop reason: SDUPTHER

## 2019-06-12 DIAGNOSIS — M54.59 LUMBAR FACET JOINT PAIN: ICD-10-CM

## 2019-06-12 RX ORDER — HYDROCODONE BITARTRATE AND ACETAMINOPHEN 10; 325 MG/1; MG/1
1 TABLET ORAL EVERY 6 HOURS PRN
Qty: 120 TABLET | Refills: 0 | Status: SHIPPED | OUTPATIENT
Start: 2019-06-16 | End: 2019-07-08 | Stop reason: SDUPTHER

## 2019-07-08 ENCOUNTER — HOSPITAL ENCOUNTER (OUTPATIENT)
Dept: PAIN MANAGEMENT | Age: 64
Discharge: HOME | End: 2019-07-08
Payer: MEDICAID

## 2019-07-08 VITALS
SYSTOLIC BLOOD PRESSURE: 156 MMHG | TEMPERATURE: 97.7 F | HEIGHT: 68 IN | DIASTOLIC BLOOD PRESSURE: 85 MMHG | WEIGHT: 265.4 LBS | BODY MASS INDEX: 40.22 KG/M2 | HEART RATE: 98 BPM | OXYGEN SATURATION: 92 % | RESPIRATION RATE: 18 BRPM

## 2019-07-08 DIAGNOSIS — M70.61 TROCHANTERIC BURSITIS OF BOTH HIPS: ICD-10-CM

## 2019-07-08 DIAGNOSIS — M70.62 TROCHANTERIC BURSITIS OF BOTH HIPS: ICD-10-CM

## 2019-07-08 DIAGNOSIS — M53.3 SACROILIAC JOINT DYSFUNCTION OF LEFT SIDE: ICD-10-CM

## 2019-07-08 DIAGNOSIS — M62.830 MUSCLE SPASM OF BACK: ICD-10-CM

## 2019-07-08 DIAGNOSIS — M54.50 CHRONIC BILATERAL LOW BACK PAIN WITHOUT SCIATICA: ICD-10-CM

## 2019-07-08 DIAGNOSIS — G89.29 CHRONIC BILATERAL LOW BACK PAIN WITHOUT SCIATICA: ICD-10-CM

## 2019-07-08 DIAGNOSIS — M54.59 LUMBAR FACET JOINT PAIN: ICD-10-CM

## 2019-07-08 PROCEDURE — 99214 OFFICE O/P EST MOD 30 MIN: CPT

## 2019-07-08 PROCEDURE — 99214 OFFICE O/P EST MOD 30 MIN: CPT | Performed by: NURSE PRACTITIONER

## 2019-07-08 RX ORDER — MELOXICAM 15 MG/1
15 TABLET ORAL DAILY
Status: ON HOLD | COMMUNITY
End: 2022-10-12 | Stop reason: HOSPADM

## 2019-07-08 RX ORDER — HYDROCODONE BITARTRATE AND ACETAMINOPHEN 10; 325 MG/1; MG/1
1 TABLET ORAL EVERY 6 HOURS PRN
Qty: 120 TABLET | Refills: 0 | Status: SHIPPED | OUTPATIENT
Start: 2019-07-16 | End: 2019-08-05 | Stop reason: SDUPTHER

## 2019-07-08 RX ORDER — METHOCARBAMOL 750 MG/1
750 TABLET, FILM COATED ORAL 3 TIMES DAILY
Qty: 90 TABLET | Refills: 2 | Status: SHIPPED | OUTPATIENT
Start: 2019-07-08 | End: 2019-10-06

## 2019-07-08 ASSESSMENT — PAIN DESCRIPTION - LOCATION: LOCATION: BACK;HIP

## 2019-07-08 ASSESSMENT — PAIN DESCRIPTION - ORIENTATION: ORIENTATION: LOWER;LEFT

## 2019-07-08 ASSESSMENT — PAIN DESCRIPTION - FREQUENCY: FREQUENCY: CONTINUOUS

## 2019-07-08 ASSESSMENT — PAIN DESCRIPTION - DIRECTION: RADIATING_TOWARDS: DOWN LEFT HIP

## 2019-07-08 ASSESSMENT — PAIN SCALES - GENERAL: PAINLEVEL_OUTOF10: 3

## 2019-07-08 ASSESSMENT — PAIN DESCRIPTION - PROGRESSION: CLINICAL_PROGRESSION: NOT CHANGED

## 2019-07-08 ASSESSMENT — PAIN DESCRIPTION - PAIN TYPE: TYPE: CHRONIC PAIN

## 2019-07-08 ASSESSMENT — PAIN DESCRIPTION - ONSET: ONSET: ON-GOING

## 2019-07-08 ASSESSMENT — ACTIVITIES OF DAILY LIVING (ADL): EFFECT OF PAIN ON DAILY ACTIVITIES: LIMITS ACTIVITIES

## 2019-07-08 ASSESSMENT — PAIN DESCRIPTION - DESCRIPTORS: DESCRIPTORS: ACHING;CONSTANT

## 2019-07-08 ASSESSMENT — PAIN - FUNCTIONAL ASSESSMENT: PAIN_FUNCTIONAL_ASSESSMENT: PREVENTS OR INTERFERES SOME ACTIVE ACTIVITIES AND ADLS

## 2019-07-08 NOTE — PROGRESS NOTES
symmetrical, tongue symmetrical, the trapezius and sternocleidomastoid muscle appearance and strength symmetrical, normal achilles bilateral, ankle clonus negative bilateral  Strength: 5/5 RUE Bi's/Tri's, 5/5 LUE Bi's/Tri's, 5/5 RLE knee flex/ext, 5/5 RLE DF/PF, 5/5 LLE knee flex/ext, 5/5 LLE DF/PF  Sensation: Equal and intact to fine touch in all extremities  Mood and affect: Normal   Nurses note reviewed along with current vital signs    Active Problem(s)  Principal Problem:    Chronic bilateral low back pain without sciatica  Active Problems:    Lumbar facet joint pain    Muscle spasm of back  Resolved Problems:    * No resolved hospital problems. *                                                                                                                            PLAN:  1. Patient is to call the office with any questions or concerns that may arise prior to next appointment. 2. Continue Norco and Robaxin  3. Follow-up in 3 months    Urine Drug Screen Current/Today:  Yes  []  No [x]     Discussion:  Discussed exam findings and plan of care with patient. Patient agreed with the current plan of care at this time. All questions from the patient were answered by the provider. Activity:   Discussed exercise as beneficial to pain reduction, encouraged stretching exercise with a focus on torso strengthening. Education Provided:  Review of [employer] [x] Agreement Review [x]  Reviewed PHQ-9  [x]    Review of Test [] Compliance Issues Discussed []   Cognitive Behavior Needs [] Exercise [x]  Financial Issues []   Tobacco/Alcohol Use [] Teaching  [x]     [] Benzodiazapine's and Narcotics:  Patient educated on the possible effects of combining Benzodiazapine's and Opioids. Explained \"Black Box Warnings\" such as; possible suppressed breathing, hypoxia, anoxia, depressed cognition, heart arrhythmia, coma and possible death. Patient verbalized understanding concerning possible effects.      Controlled Substance

## 2019-08-05 DIAGNOSIS — M54.59 LUMBAR FACET JOINT PAIN: ICD-10-CM

## 2019-08-05 RX ORDER — HYDROCODONE BITARTRATE AND ACETAMINOPHEN 10; 325 MG/1; MG/1
1 TABLET ORAL EVERY 6 HOURS PRN
Qty: 120 TABLET | Refills: 0 | Status: SHIPPED | OUTPATIENT
Start: 2019-08-15 | End: 2019-09-03 | Stop reason: SDUPTHER

## 2019-09-03 DIAGNOSIS — M54.59 LUMBAR FACET JOINT PAIN: ICD-10-CM

## 2019-09-03 RX ORDER — HYDROCODONE BITARTRATE AND ACETAMINOPHEN 10; 325 MG/1; MG/1
1 TABLET ORAL EVERY 6 HOURS PRN
Qty: 120 TABLET | Refills: 0 | Status: SHIPPED | OUTPATIENT
Start: 2019-09-14 | End: 2019-10-09 | Stop reason: SDUPTHER

## 2019-10-09 ENCOUNTER — HOSPITAL ENCOUNTER (OUTPATIENT)
Dept: PAIN MANAGEMENT | Age: 64
Discharge: HOME | End: 2019-10-09
Payer: MEDICAID

## 2019-10-09 VITALS
BODY MASS INDEX: 39.01 KG/M2 | HEART RATE: 105 BPM | SYSTOLIC BLOOD PRESSURE: 160 MMHG | WEIGHT: 257.4 LBS | OXYGEN SATURATION: 93 % | RESPIRATION RATE: 18 BRPM | TEMPERATURE: 98 F | DIASTOLIC BLOOD PRESSURE: 95 MMHG | HEIGHT: 68 IN

## 2019-10-09 DIAGNOSIS — M62.830 MUSCLE SPASM OF BACK: ICD-10-CM

## 2019-10-09 DIAGNOSIS — M54.59 LUMBAR FACET JOINT PAIN: ICD-10-CM

## 2019-10-09 DIAGNOSIS — M53.3 SACROILIAC JOINT DYSFUNCTION OF LEFT SIDE: ICD-10-CM

## 2019-10-09 DIAGNOSIS — M70.62 TROCHANTERIC BURSITIS OF BOTH HIPS: ICD-10-CM

## 2019-10-09 DIAGNOSIS — G89.29 CHRONIC BILATERAL LOW BACK PAIN WITHOUT SCIATICA: ICD-10-CM

## 2019-10-09 DIAGNOSIS — M70.61 TROCHANTERIC BURSITIS OF BOTH HIPS: ICD-10-CM

## 2019-10-09 DIAGNOSIS — M54.50 CHRONIC BILATERAL LOW BACK PAIN WITHOUT SCIATICA: ICD-10-CM

## 2019-10-09 PROCEDURE — 99215 OFFICE O/P EST HI 40 MIN: CPT

## 2019-10-09 PROCEDURE — 99214 OFFICE O/P EST MOD 30 MIN: CPT | Performed by: NURSE PRACTITIONER

## 2019-10-09 RX ORDER — HYDROCODONE BITARTRATE AND ACETAMINOPHEN 10; 325 MG/1; MG/1
1 TABLET ORAL EVERY 6 HOURS PRN
Qty: 120 TABLET | Refills: 0 | Status: SHIPPED | OUTPATIENT
Start: 2019-10-14 | End: 2019-10-31 | Stop reason: SDUPTHER

## 2019-10-09 RX ORDER — METHOCARBAMOL 750 MG/1
750 TABLET, FILM COATED ORAL 3 TIMES DAILY
Qty: 90 TABLET | Refills: 2 | Status: SHIPPED | OUTPATIENT
Start: 2019-10-09 | End: 2020-01-07

## 2019-10-09 ASSESSMENT — PAIN DESCRIPTION - PROGRESSION: CLINICAL_PROGRESSION: NOT CHANGED

## 2019-10-09 ASSESSMENT — PAIN DESCRIPTION - ORIENTATION: ORIENTATION: LOWER

## 2019-10-09 ASSESSMENT — PAIN DESCRIPTION - PAIN TYPE: TYPE: CHRONIC PAIN

## 2019-10-09 ASSESSMENT — PAIN DESCRIPTION - DESCRIPTORS: DESCRIPTORS: ACHING;CONSTANT

## 2019-10-09 ASSESSMENT — PAIN DESCRIPTION - ONSET: ONSET: ON-GOING

## 2019-10-09 ASSESSMENT — PAIN - FUNCTIONAL ASSESSMENT: PAIN_FUNCTIONAL_ASSESSMENT: PREVENTS OR INTERFERES SOME ACTIVE ACTIVITIES AND ADLS

## 2019-10-09 ASSESSMENT — PAIN DESCRIPTION - LOCATION: LOCATION: BACK

## 2019-10-09 ASSESSMENT — PAIN SCALES - GENERAL: PAINLEVEL_OUTOF10: 3

## 2019-10-09 ASSESSMENT — ACTIVITIES OF DAILY LIVING (ADL): EFFECT OF PAIN ON DAILY ACTIVITIES: LIMITS ACTIVITIES

## 2019-10-09 ASSESSMENT — PAIN DESCRIPTION - FREQUENCY: FREQUENCY: CONTINUOUS

## 2019-10-31 DIAGNOSIS — M54.59 LUMBAR FACET JOINT PAIN: ICD-10-CM

## 2019-11-01 RX ORDER — HYDROCODONE BITARTRATE AND ACETAMINOPHEN 10; 325 MG/1; MG/1
1 TABLET ORAL 2 TIMES DAILY PRN
Qty: 60 TABLET | Refills: 0 | Status: SHIPPED | OUTPATIENT
Start: 2019-11-01 | End: 2019-12-01

## 2022-10-09 ENCOUNTER — HOSPITAL ENCOUNTER (INPATIENT)
Age: 67
LOS: 3 days | Discharge: HOME | DRG: 065 | End: 2022-10-12
Attending: PSYCHIATRY & NEUROLOGY | Admitting: PSYCHIATRY & NEUROLOGY
Payer: MEDICARE

## 2022-10-09 PROBLEM — R53.1 WEAKNESS: Status: ACTIVE | Noted: 2022-10-09

## 2022-10-09 PROBLEM — I63.9 ACUTE ISCHEMIC STROKE (HCC): Status: ACTIVE | Noted: 2022-10-09

## 2022-10-09 PROBLEM — Z92.82 S/P ADMN TPA IN DIFF FAC W/N LAST 24 HR BEF ADM TO CRNT FAC: Status: ACTIVE | Noted: 2022-10-09

## 2022-10-09 PROCEDURE — 2000000000 HC ICU R&B

## 2022-10-09 PROCEDURE — 6370000000 HC RX 637 (ALT 250 FOR IP): Performed by: PSYCHIATRY & NEUROLOGY

## 2022-10-09 PROCEDURE — 2700000000 HC OXYGEN THERAPY PER DAY

## 2022-10-09 RX ORDER — ATORVASTATIN CALCIUM 80 MG/1
80 TABLET, FILM COATED ORAL NIGHTLY
Status: DISCONTINUED | OUTPATIENT
Start: 2022-10-09 | End: 2022-10-12 | Stop reason: HOSPADM

## 2022-10-09 RX ORDER — FUROSEMIDE 20 MG/1
20 TABLET ORAL 2 TIMES DAILY
COMMUNITY

## 2022-10-09 RX ORDER — ASPIRIN 300 MG/1
300 SUPPOSITORY RECTAL DAILY
Status: DISCONTINUED | OUTPATIENT
Start: 2022-10-10 | End: 2022-10-12 | Stop reason: HOSPADM

## 2022-10-09 RX ORDER — DULOXETIN HYDROCHLORIDE 60 MG/1
60 CAPSULE, DELAYED RELEASE ORAL DAILY
Status: DISCONTINUED | OUTPATIENT
Start: 2022-10-09 | End: 2022-10-12 | Stop reason: HOSPADM

## 2022-10-09 RX ORDER — ASPIRIN 81 MG/1
81 TABLET ORAL DAILY
Status: DISCONTINUED | OUTPATIENT
Start: 2022-10-10 | End: 2022-10-12 | Stop reason: HOSPADM

## 2022-10-09 RX ORDER — ASPIRIN 81 MG/1
81 TABLET ORAL DAILY
Status: DISCONTINUED | OUTPATIENT
Start: 2022-10-09 | End: 2022-10-09

## 2022-10-09 RX ORDER — ONDANSETRON 4 MG/1
4 TABLET, ORALLY DISINTEGRATING ORAL EVERY 8 HOURS PRN
Status: DISCONTINUED | OUTPATIENT
Start: 2022-10-09 | End: 2022-10-12 | Stop reason: HOSPADM

## 2022-10-09 RX ORDER — BUDESONIDE 0.25 MG/2ML
0.25 INHALANT ORAL 2 TIMES DAILY
Status: DISCONTINUED | OUTPATIENT
Start: 2022-10-10 | End: 2022-10-12 | Stop reason: HOSPADM

## 2022-10-09 RX ORDER — BUDESONIDE AND FORMOTEROL FUMARATE DIHYDRATE 80; 4.5 UG/1; UG/1
1 AEROSOL RESPIRATORY (INHALATION) DAILY
Status: DISCONTINUED | OUTPATIENT
Start: 2022-10-10 | End: 2022-10-09 | Stop reason: CLARIF

## 2022-10-09 RX ORDER — ARFORMOTEROL TARTRATE 15 UG/2ML
15 SOLUTION RESPIRATORY (INHALATION) 2 TIMES DAILY
Status: DISCONTINUED | OUTPATIENT
Start: 2022-10-10 | End: 2022-10-12 | Stop reason: HOSPADM

## 2022-10-09 RX ORDER — POLYETHYLENE GLYCOL 3350 17 G/17G
17 POWDER, FOR SOLUTION ORAL DAILY PRN
Status: DISCONTINUED | OUTPATIENT
Start: 2022-10-09 | End: 2022-10-12 | Stop reason: HOSPADM

## 2022-10-09 RX ORDER — LOSARTAN POTASSIUM 100 MG/1
100 TABLET ORAL DAILY
Status: DISCONTINUED | OUTPATIENT
Start: 2022-10-09 | End: 2022-10-12 | Stop reason: HOSPADM

## 2022-10-09 RX ORDER — HYDROCODONE BITARTRATE AND ACETAMINOPHEN 10; 325 MG/1; MG/1
1 TABLET ORAL EVERY 6 HOURS PRN
COMMUNITY

## 2022-10-09 RX ORDER — IPRATROPIUM BROMIDE AND ALBUTEROL SULFATE 2.5; .5 MG/3ML; MG/3ML
1 SOLUTION RESPIRATORY (INHALATION) EVERY 4 HOURS PRN
Status: DISCONTINUED | OUTPATIENT
Start: 2022-10-09 | End: 2022-10-12 | Stop reason: HOSPADM

## 2022-10-09 RX ORDER — ONDANSETRON 2 MG/ML
4 INJECTION INTRAMUSCULAR; INTRAVENOUS EVERY 6 HOURS PRN
Status: DISCONTINUED | OUTPATIENT
Start: 2022-10-09 | End: 2022-10-12 | Stop reason: HOSPADM

## 2022-10-09 RX ORDER — HYDROCODONE BITARTRATE AND ACETAMINOPHEN 10; 325 MG/1; MG/1
1 TABLET ORAL EVERY 6 HOURS PRN
Status: DISCONTINUED | OUTPATIENT
Start: 2022-10-09 | End: 2022-10-12 | Stop reason: HOSPADM

## 2022-10-09 RX ORDER — ENOXAPARIN SODIUM 100 MG/ML
40 INJECTION SUBCUTANEOUS DAILY
Status: DISCONTINUED | OUTPATIENT
Start: 2022-10-10 | End: 2022-10-11

## 2022-10-09 RX ORDER — FUROSEMIDE 20 MG/1
20 TABLET ORAL 2 TIMES DAILY
Status: DISCONTINUED | OUTPATIENT
Start: 2022-10-09 | End: 2022-10-12 | Stop reason: HOSPADM

## 2022-10-09 RX ADMIN — HYDROCODONE BITARTRATE AND ACETAMINOPHEN 1 TABLET: 10; 325 TABLET ORAL at 21:05

## 2022-10-09 RX ADMIN — FUROSEMIDE 20 MG: 20 TABLET ORAL at 21:06

## 2022-10-09 RX ADMIN — ATORVASTATIN CALCIUM 80 MG: 80 TABLET, FILM COATED ORAL at 21:06

## 2022-10-09 ASSESSMENT — PAIN DESCRIPTION - LOCATION: LOCATION: BACK

## 2022-10-09 ASSESSMENT — PAIN SCALES - WONG BAKER: WONGBAKER_NUMERICALRESPONSE: 0

## 2022-10-09 ASSESSMENT — PAIN - FUNCTIONAL ASSESSMENT: PAIN_FUNCTIONAL_ASSESSMENT: ACTIVITIES ARE NOT PREVENTED

## 2022-10-09 ASSESSMENT — PAIN DESCRIPTION - DESCRIPTORS: DESCRIPTORS: ACHING

## 2022-10-09 ASSESSMENT — PAIN SCALES - GENERAL
PAINLEVEL_OUTOF10: 4
PAINLEVEL_OUTOF10: 2

## 2022-10-09 ASSESSMENT — PAIN DESCRIPTION - ORIENTATION: ORIENTATION: LOWER

## 2022-10-09 ASSESSMENT — PAIN DESCRIPTION - PAIN TYPE: TYPE: CHRONIC PAIN

## 2022-10-09 ASSESSMENT — PAIN DESCRIPTION - FREQUENCY: FREQUENCY: CONTINUOUS

## 2022-10-10 ENCOUNTER — APPOINTMENT (OUTPATIENT)
Dept: MRI IMAGING | Age: 67
DRG: 065 | End: 2022-10-10
Attending: PSYCHIATRY & NEUROLOGY
Payer: MEDICARE

## 2022-10-10 LAB
CHOLESTEROL, TOTAL: 99 MG/DL (ref 160–199)
HBA1C MFR BLD: 6.9 % (ref 4–6)
HCT VFR BLD CALC: 47.1 % (ref 42–52)
HDLC SERPL-MCNC: 49 MG/DL (ref 55–121)
HEMOGLOBIN: 15.6 G/DL (ref 14–18)
LDL CHOLESTEROL CALCULATED: 25 MG/DL
LV EF: 65 %
LVEF MODALITY: NORMAL
MCH RBC QN AUTO: 30.5 PG (ref 27–31)
MCHC RBC AUTO-ENTMCNC: 33.1 G/DL (ref 33–37)
MCV RBC AUTO: 92 FL (ref 80–94)
PDW BLD-RTO: 13.1 % (ref 11.5–14.5)
PLATELET # BLD: 194 K/UL (ref 130–400)
PMV BLD AUTO: 8.5 FL (ref 9.4–12.4)
RBC # BLD: 5.12 M/UL (ref 4.7–6.1)
TRIGL SERPL-MCNC: 127 MG/DL (ref 0–149)
WBC # BLD: 12.6 K/UL (ref 4.8–10.8)

## 2022-10-10 PROCEDURE — 93306 TTE W/DOPPLER COMPLETE: CPT

## 2022-10-10 PROCEDURE — 36415 COLL VENOUS BLD VENIPUNCTURE: CPT

## 2022-10-10 PROCEDURE — 94640 AIRWAY INHALATION TREATMENT: CPT

## 2022-10-10 PROCEDURE — 85027 COMPLETE CBC AUTOMATED: CPT

## 2022-10-10 PROCEDURE — 70544 MR ANGIOGRAPHY HEAD W/O DYE: CPT

## 2022-10-10 PROCEDURE — 2700000000 HC OXYGEN THERAPY PER DAY

## 2022-10-10 PROCEDURE — 70551 MRI BRAIN STEM W/O DYE: CPT | Performed by: RADIOLOGY

## 2022-10-10 PROCEDURE — 83036 HEMOGLOBIN GLYCOSYLATED A1C: CPT

## 2022-10-10 PROCEDURE — 6370000000 HC RX 637 (ALT 250 FOR IP): Performed by: PSYCHIATRY & NEUROLOGY

## 2022-10-10 PROCEDURE — 93308 TTE F-UP OR LMTD: CPT

## 2022-10-10 PROCEDURE — 70547 MR ANGIOGRAPHY NECK W/O DYE: CPT

## 2022-10-10 PROCEDURE — 92610 EVALUATE SWALLOWING FUNCTION: CPT

## 2022-10-10 PROCEDURE — 70551 MRI BRAIN STEM W/O DYE: CPT

## 2022-10-10 PROCEDURE — 93880 EXTRACRANIAL BILAT STUDY: CPT

## 2022-10-10 PROCEDURE — 70547 MR ANGIOGRAPHY NECK W/O DYE: CPT | Performed by: RADIOLOGY

## 2022-10-10 PROCEDURE — 2580000003 HC RX 258: Performed by: PSYCHIATRY & NEUROLOGY

## 2022-10-10 PROCEDURE — 80061 LIPID PANEL: CPT

## 2022-10-10 PROCEDURE — 70544 MR ANGIOGRAPHY HEAD W/O DYE: CPT | Performed by: RADIOLOGY

## 2022-10-10 PROCEDURE — 99223 1ST HOSP IP/OBS HIGH 75: CPT | Performed by: PSYCHIATRY & NEUROLOGY

## 2022-10-10 PROCEDURE — 2000000000 HC ICU R&B

## 2022-10-10 PROCEDURE — 92523 SPEECH SOUND LANG COMPREHEN: CPT

## 2022-10-10 PROCEDURE — 6360000002 HC RX W HCPCS: Performed by: PSYCHIATRY & NEUROLOGY

## 2022-10-10 RX ORDER — 0.9 % SODIUM CHLORIDE 0.9 %
1000 INTRAVENOUS SOLUTION INTRAVENOUS ONCE
Status: COMPLETED | OUTPATIENT
Start: 2022-10-10 | End: 2022-10-10

## 2022-10-10 RX ORDER — DEXTROSE AND SODIUM CHLORIDE 5; .9 G/100ML; G/100ML
INJECTION, SOLUTION INTRAVENOUS CONTINUOUS
Status: DISCONTINUED | OUTPATIENT
Start: 2022-10-10 | End: 2022-10-11

## 2022-10-10 RX ADMIN — BUDESONIDE 250 MCG: 0.25 SUSPENSION RESPIRATORY (INHALATION) at 06:21

## 2022-10-10 RX ADMIN — FUROSEMIDE 20 MG: 20 TABLET ORAL at 09:09

## 2022-10-10 RX ADMIN — DEXTROSE AND SODIUM CHLORIDE: 5; 900 INJECTION, SOLUTION INTRAVENOUS at 20:49

## 2022-10-10 RX ADMIN — DEXTROSE AND SODIUM CHLORIDE: 5; 900 INJECTION, SOLUTION INTRAVENOUS at 04:18

## 2022-10-10 RX ADMIN — HYDROCODONE BITARTRATE AND ACETAMINOPHEN 1 TABLET: 10; 325 TABLET ORAL at 09:06

## 2022-10-10 RX ADMIN — LOSARTAN POTASSIUM 100 MG: 100 TABLET, FILM COATED ORAL at 09:06

## 2022-10-10 RX ADMIN — ATORVASTATIN CALCIUM 80 MG: 80 TABLET, FILM COATED ORAL at 19:28

## 2022-10-10 RX ADMIN — BUDESONIDE 250 MCG: 0.25 SUSPENSION RESPIRATORY (INHALATION) at 18:22

## 2022-10-10 RX ADMIN — ARFORMOTEROL TARTRATE 15 MCG: 15 SOLUTION RESPIRATORY (INHALATION) at 06:19

## 2022-10-10 RX ADMIN — FUROSEMIDE 20 MG: 20 TABLET ORAL at 19:28

## 2022-10-10 RX ADMIN — ENOXAPARIN SODIUM 40 MG: 100 INJECTION SUBCUTANEOUS at 19:28

## 2022-10-10 RX ADMIN — ARFORMOTEROL TARTRATE 15 MCG: 15 SOLUTION RESPIRATORY (INHALATION) at 18:22

## 2022-10-10 RX ADMIN — SODIUM CHLORIDE 1000 ML: 9 INJECTION, SOLUTION INTRAVENOUS at 00:19

## 2022-10-10 RX ADMIN — DULOXETINE 60 MG: 60 CAPSULE, DELAYED RELEASE ORAL at 09:06

## 2022-10-10 RX ADMIN — ASPIRIN 81 MG: 81 TABLET, COATED ORAL at 19:28

## 2022-10-10 ASSESSMENT — PAIN DESCRIPTION - LOCATION: LOCATION: BACK

## 2022-10-10 ASSESSMENT — PAIN SCALES - GENERAL
PAINLEVEL_OUTOF10: 4
PAINLEVEL_OUTOF10: 0

## 2022-10-10 ASSESSMENT — PAIN DESCRIPTION - ORIENTATION: ORIENTATION: LOWER

## 2022-10-10 ASSESSMENT — PAIN DESCRIPTION - DESCRIPTORS: DESCRIPTORS: ACHING

## 2022-10-10 NOTE — PROGRESS NOTES
Occupational Therapy  Per tPA protocol, will evaluate on 10/11 as appropriate.   Electronically signed by Mallory Toledo OT on 10/10/2022 at 3:59 PM

## 2022-10-10 NOTE — PROGRESS NOTES
Vascular lab preliminary results. Bilateral carotid duplex ultrasound performed. Rt CCA, ICA and ECA appear occluded. Lt <50% stenosis. Informed nurse Sharonosrima Sours of preliminary results. Final report pending.

## 2022-10-10 NOTE — H&P
[facility]   History and Physical        Patient:   Benigno Baker  MR#:    [id number]  Account Number:                   [id number]      Room:    [address] [place] [address]   YOB: 1955  Date of Progress Note: 10/10/2022  Time of Note                           6:34 AM  Attending Physician:  Kolton Pat MD        Admitting diagnosis: Left-sided weakness status post tPA      CHIEF COMPLAINT: Left-sided weakness      HISTORY OF PRESENT ILLNESS:   This 78 y.o. male past medical history significant for coronary disease, hypertension, peripheral vascular disease and smoking along with recent acute ischemic stroke in September 2022 is seen for evaluation of left-sided weakness. The patient presented to an outside hospital after having acute flaccid paralysis of the left side. He was seen by teleneurology at an outside facility and was given intravenous tPA with essentially complete resolution of his symptoms. He was transferred for higher level of care. As per the ER physician at the outside facility the patient had an acute ischemic stroke 1 month ago with multifocal infarcts in the right hemisphere with right carotid occlusion. He indicates that he has been on aspirin and Plavix for quite some time and he was continued on this after the stroke. Currently denies diplopia, dysarthria, dysphagia, weakness or numbness. Denies any headache or confusion. Some low blood pressure overnight and given fluid bolus. REVIEW OF SYSTEMS:  Constitutional - No fever or chills. No diaphoresis or significant fatigue. HENT -  No tinnitus or significant hearing loss. Eyes - no sudden vision change or eye pain  Respiratory - no significant shortness of breath or cough  Cardiovascular - no chest pain No palpitations or significant leg swelling  Gastrointestinal - no abdominal swelling or pain. Genitourinary - No difficulty urinating, dysuria  Musculoskeletal - no back pain or myalgia.   Skin - no color change or rash  Neurologic - No seizures. No lateralizing weakness. Hematologic - no easy bruising or excessive bleeding. Psychiatric - no severe anxiety or nervousness. All other review of systems are negative.       Past Medical History:      Diagnosis Date    Carotid artery stenosis     Cellulitis     Chest pain     Chronic respiratory disease     Claudication ([facility])     COPD (chronic obstructive pulmonary disease) ([facility])     Depression     Essential hypertension     History of measles     Hyperlipidemia     Kyphosis     PVD (peripheral vascular disease) ([facility])     Shortness of breath     Smoker        Past Surgical History:      Procedure Laterality Date    CARDIAC CATHETERIZATION      CAROTID ENDARTERECTOMY      COLONOSCOPY      OTHER SURGICAL HISTORY  7/23/2012 [facility]    Aortogram and runoff, Interventional procedure was attempted to cross totally occuled common iliac artery aborted after approximatley 20 to 30 minute effort  Right common femoral artery 5 Danish sheath , left common femoral artery 7 Danish sheath    VASECTOMY         Medications in Hospital:      Current Facility-Administered Medications:     dextrose 5 % and 0.9 % sodium chloride infusion, , IntraVENous, Continuous, Nelson Joseph MD, Last Rate: 100 mL/hr at 10/10/22 0418, New Bag at 10/10/22 0418    ipratropium-albuterol (DUONEB) nebulizer solution 1 ampule, 1 ampule, Inhalation, Q4H PRN, Nelson Joseph MD    DULoxetine (CYMBALTA) extended release capsule 60 mg, 60 mg, Oral, Daily, Nelson Joseph MD    furosemide (LASIX) tablet 20 mg, 20 mg, Oral, BID, Nelson Joseph MD, 20 mg at 10/09/22 2106    HYDROcodone-acetaminophen (NORCO)  MG per tablet 1 tablet, 1 tablet, Oral, Q6H PRN, Nelson Joseph MD, 1 tablet at 10/09/22 2105    losartan (COZAAR) tablet 100 mg, 100 mg, Oral, Daily, Nelson Joseph MD    ondansetron (ZOFRAN-ODT) disintegrating tablet 4 mg, 4 mg, Oral, Q8H PRN **OR** ondansetron (ZOFRAN) injection 4 mg, 4 mg, IntraVENous, Q6H PRN, Zoraida Joyce MD    polyethylene glycol (GLYCOLAX) packet 17 g, 17 g, Oral, Daily PRN, Zoraida Joyce MD    enoxaparin (LOVENOX) injection 40 mg, 40 mg, SubCUTAneous, Daily, Zoraida Joyce MD    aspirin EC tablet 81 mg, 81 mg, Oral, Daily **OR** aspirin suppository 300 mg, 300 mg, Rectal, Daily, Zoraida Joyce MD    perflutren lipid microspheres (DEFINITY) injection 1.65 mg, 1.5 mL, IntraVENous, ONCE PRN, Zoraida Joyce MD    atorvastatin (LIPITOR) tablet 80 mg, 80 mg, Oral, Nightly, Zoraida Joyce MD, 80 mg at 10/09/22 2106    budesonide (PULMICORT) nebulizer suspension 250 mcg, 0.25 mg, Nebulization, BID, 250 mcg at 10/10/22 0621 **AND** Arformoterol Tartrate (BROVANA) nebulizer solution 15 mcg, 15 mcg, Nebulization, BID, Zoraida Joyce MD, 15 mcg at 10/10/22 0619    Allergies:  Neosporin original [bacitracin-neomycin-polymyxin] and Dye [iodides]    Social History:   TOBACCO:   reports that he has been smoking cigarettes. He has a 20.00 pack-year smoking history. He has never used smokeless tobacco.  ETOH:   reports that he does not currently use alcohol. Family History:       Problem Relation Age of Onset    Heart Disease Brother         CAD    Heart Disease Father         CAD           Physical Exam:    Vitals: BP (!) 148/73   Pulse 55   Temp 98 °F (36.7 °C) (Temporal)   Resp 16   Ht 5' 8\" (1.727 m)   Wt 243 lb 9.6 oz (110.5 kg)   SpO2 94%   BMI 37.04 kg/m²     Constitutional - well developed, well nourished. Eyes - conjunctiva normal.  Pupils not tested  Ear, nose, throat -hearing intact to finger rub No scars, masses, or lesions over external nose or ears, no atrophy of tongue  Neck-symmetric, no masses noted, no jugular vein distension  Respiration- chest wall appears symmetric, good expansion,   normal effort without use of accessory muscles  Musculoskeletal - no significant wasting of muscles noted, no bony deformities  Extremities-no clubbing, cyanosis or edema  Skin - warm, dry, and intact. No rash, erythema, or pallor. Psychiatric - mood, affect, and behavior appear normal.      Neurological exam  Awake, alert, fluent oriented x 3 appropriate affect  Attention and concentration appear appropriate  Recent and remote memory appears unremarkable  Speech normal without dysarthria  No clear issues with language of fund of knowledge    Cranial Nerve Exam   CN II- Visual fields grossly unremarkable  CN III, IV,VI-EOMI, No nystagmus, conjugate eye movements, no ptosis  CN V-sensation intact to LT over face  CN VII-left lower facial droop  CN VIII-Hearing intact to finger rub  CN IX and X- Palate not tested  CN XI-not test shoulder shrug  CN XII-Tongue midline with no fasciculations or fibrillations    Motor Exam  Antigravity throughout upper and lower extremities bilaterally, no cogwheeling, normal tone  Unclear if any giveaway weakness on the left    Sensory Exam  Sensation intact to light touch and temperature upper and lower extremities bilaterally    Reflexes   Not tested    Tremors- no tremors in hands or head noted    Gait  Not tested    Coordination  Finger to nose-unremarkable        CBC:   Recent Labs     10/10/22  0110   WBC 12.6*   HGB 15.6          BMP:  No results for input(s): NA, K, CL, CO2, BUN, CREATININE, GLUCOSE in the last 72 hours. Hepatic: No results for input(s): AST, ALT, ALB, BILITOT, ALKPHOS in the last 72 hours. Lipids:   Recent Labs     10/10/22  0110   CHOL 99*   HDL 49*       INR: No results for input(s): INR in the last 72 hours.         Assessment and Plan     Acute ischemic stroke status post tPA    Patient with a known right carotid occlusion and recent right hemispheric infarcts    Examination  Awake, alert, oriented, fluent  Mild left lower facial droop  Unclear some trace giveaway weakness in the left arm and left leg      Plan  MRI of the brain/MRA of the head and neck/carotid ultrasound/echocardiogram/last lipid/hemoglobin A1c/telemetry    No antiplatelets/anticoagulants 24 hours post tPA  Suspect we will need to change to full anticoagulation pending work-up due to recurrent ischemic events despite being on aspirin and Plavix    Hypertension-continue to monitor  Hyperlipidemia-on statin  COPD-on nebulizers  Back pain-Cymbalta    PT/OT/speech    Supportive care    Please feel free to call with any questions   [phone] (cell phone)    Dr Nilton Mares certified in Neurology  Board Certified in Clinical Neurophysiology  Fellowship Trained in [place] [id number] Neurophysiology      EMR Dragon/transcription disclaimer:Significant part of this  encounter note is electronic transcription/translation of spoken language to printed text. The electronic translation of spoken language may be erroneous, or at times, nonsensical words or phrases may be inadvertently transcribed.  Although I have reviewed the note for such errors, some may still exist.

## 2022-10-10 NOTE — PROGRESS NOTES
Pharmacy Consult      Campbell Ronquillo is a 78 y.o. male for whom pharmacy has been consulted to review this patient's medication profile to evaluate IV medications and change all base solutions to 0.9% sodium chloride if possible based on compatibility and product availability. Patient Active Problem List   Diagnosis    Carotid artery stenosis    COPD (chronic obstructive pulmonary disease) ([facility])    Essential hypertension    PVD (peripheral vascular disease) with claudication ([facility])    Dilatation of aorta ([facility])    Chronic bilateral low back pain without sciatica    Lumbar facet joint pain    Muscle spasm of back    Sacroiliac joint dysfunction of left side    Trochanteric bursitis of both hips    Acute ischemic stroke ([facility])    S/P admn tPA in diff fac w/n last 24 hr bef adm to crnt fac    Weakness       Allergies:  Neosporin original [bacitracin-neomycin-polymyxin] and Dye [iodides]     No results for input(s): CREATININE in the last 72 hours. Ht/Wt:   Ht Readings from Last 1 Encounters:   10/09/19 5' 8\" (1.727 m)        Wt Readings from Last 1 Encounters:   10/09/19 257 lb 6.4 oz (116.8 kg)         CrCl cannot be calculated (Patient's most recent lab result is older than the maximum 30 days allowed. ). Assessment/Plan:    No changes needed at this time    Thank you for the consult. Will continue to follow.     Electronically signed by Lin Jarrell, [facility] on 10/9/2022 at 9:16 PM No changes needed at this time

## 2022-10-10 NOTE — PROGRESS NOTES
Speech Language Pathology  Facility/Department: [facility]  Initial Speech/Language/Cognitive/Swallow Assessment    NAME: Eh Burton  : 1955   MRN: [id number]  ADMISSION DATE: 10/9/2022  ADMITTING DIAGNOSIS: has Carotid artery stenosis; COPD (chronic obstructive pulmonary disease) ([facility] [address] Essential hypertension; PVD (peripheral vascular disease) with claudication ([facility] [address] Dilatation of aorta ([facility] [address] Chronic bilateral low back pain without sciatica; Lumbar facet joint pain; Muscle spasm of back; Sacroiliac joint dysfunction of left side; Trochanteric bursitis of both hips; Acute ischemic stroke ([facility] [address] S/P admn tPA in diff fac w/n last 24 hr bef adm to crnt fac; and Weakness on their problem list.    Date of Eval: 10/10/2022   Evaluating Therapist: Ajay Peace SLP    Assessment:  Completed assessment. SLP ranked functional intelligibility of speech for unfamiliar listeners at 100% in conversation. No significant deficits were noted in the areas of attention, auditory comprehension, verbal expression, orientation, memory, or reasoning/problem solving. Also evaluated patient's swallowing function. Patient exhibited inconsistently sluggish laryngeal elevation for swallow airway protection. Even so, no outward S/S penetration/aspiration was observed with any regular solid consistency trial or thin H2O trial presented during assessment this date. At this time, recommend continuation regular solid consistency with thin liquids. Self-feed. Thank you for this consult. Goals:  Short-term Goals  Timeframe for Short-term Goals: 1-2x/day for 3 days  Goal 1: Patient will tolerate regular solid consistency and thin liquids with min S/S penetration/aspiration during PO intake. Goal 2: Monitor speech/language/cognitive functioning.      Subjective:  General  Chart Reviewed: Yes  Patient assessed for rehabilitation services?: Yes  Family / Caregiver Present: No    Objective:  Oral Motor:   Labial ROM: Adequate during labial retraction trials and labial protrusion trials. Labial Strength: Adequate during labial compression trials. Labial Coordination: Adequate movements were noted. Lingual ROM: Adequate during lingual extension trials with full point achieved; adequate during lingual elevation trials without use of accessory jaw movement; adequate movements noted bilaterally. Lingual Coordination: Mildly slowed movements were noted. Auditory Comprehension  Comprehension:  (While mild delays were noted, patient demonstrated ability to answer yes/no questions of increased complexity at independent level. While mild delays were observed, patient demonstrated ability to follow complex 1 and simple 2 step commands independently.)    Expression  Primary Mode of Expression:  (Confrontation naming of items in room was considered to be [facility]. Structured responsive speech was considered to be mildly delayed. Responses in natural conversation were considered to be timely and appropriate.)    Motor Speech:  (Patient exhibited mildly slowed lingual movements during verbalizations. SLP still ranked functional intelligibility of speech for unfamiliar listeners at 100% in verbalizations with background noise present.)    Overall Orientation Status:  (Patient demonstrated ability to verbalize name, birthday, age, address, phone number, city, state, hospital, month, JENNIFER, and year at independent level.)    Memory:  (Patient demonstrated appropriate immediate memory with sequences of unrelated numbers/words set up to 5 items without repetitions provided. Patient demonstrated appropriate short-term with 10 minute delay+distractions present during assessment.)    Problem Solving:  (Patient verbalized PCP and pharmacy at independent level. Patient verbalized conditions in which he takes medications independently.  Patient demonstrated ability to verbalize appropriate solutions to situations that could occur during activities of daily living at independent level. )    Additional Assessments:  Assessed patient's swallowing function. Patient exhibited functional oral prep and transit of regular solid consistency trials, presented independently, with timing of oral transit primarily measuring 1-2 seconds in length and with min oral cavity residue noted post swallows; residue cleared from the mouth with additional dry swallows. Oral transit of thin H2O trials, presented independently via straw, primarily measured 1 second in length. Laryngeal elevation during swallow initiation was considered to be inconsistently sluggish for swallow airway protection. Even so, no outward S/S penetration/aspiration was observed with any regular solid consistency trial or thin H2O trial presented during evaluation this date. At this time, recommend continuation regular solid consistency with thin liquids. Self-feed.       Electronically signed by TAZ Durbin on 10/10/2022 at 1:25 PM

## 2022-10-10 NOTE — PROGRESS NOTES
4 Eyes Skin Assessment    Joselito Esparza is being assessed upon: Admission    I agree that Hardeep Pacheco, RN, along with Sandy Sierra RN (either 2 RN's or 1 LPN and 1 RN) have performed a thorough Head to Toe Skin Assessment on the patient. ALL assessment sites listed below have been assessed. Areas assessed by both nurses:     [x]   Head, Face, and Ears   [x]   Shoulders, Back, and Chest  [x]   Arms, Elbows, and Hands   [x]   Coccyx, Sacrum, and Ischium  [x]   Legs, Feet, and Heels    Does the Patient have Skin Breakdown?  No    Ernesto Prevention initiated: No  Wound Care Orders initiated: No    WOC nurse consulted for Pressure Injury (Stage 3,4, Unstageable, DTI, NWPT, and Complex wounds) and New or Established Ostomies: NA        Primary Nurse eSignature: Helena Jain RN on 10/9/2022 at 10:44 PM      Co-Signer eSignature: Electronically signed by Sandy Sierra RN on 10/9/22 at 10:45 PM CDT

## 2022-10-11 LAB
ALBUMIN SERPL-MCNC: 3.6 G/DL (ref 3.5–5.2)
ALP BLD-CCNC: 45 U/L (ref 40–130)
ALT SERPL-CCNC: 34 U/L (ref 5–41)
ANION GAP SERPL CALCULATED.3IONS-SCNC: 8 MMOL/L (ref 7–19)
AST SERPL-CCNC: 15 U/L (ref 5–40)
BASOPHILS ABSOLUTE: 0 K/UL (ref 0–0.2)
BASOPHILS RELATIVE PERCENT: 0.1 % (ref 0–1)
BILIRUB SERPL-MCNC: 1 MG/DL (ref 0.2–1.2)
BUN BLDV-MCNC: 20 MG/DL (ref 8–23)
CALCIUM SERPL-MCNC: 8.3 MG/DL (ref 8.8–10.2)
CHLORIDE BLD-SCNC: 100 MMOL/L (ref 98–111)
CO2: 30 MMOL/L (ref 22–29)
CREAT SERPL-MCNC: 0.7 MG/DL (ref 0.5–1.2)
EOSINOPHILS ABSOLUTE: 0.2 K/UL (ref 0–0.6)
EOSINOPHILS RELATIVE PERCENT: 1.5 % (ref 0–5)
GFR AFRICAN AMERICAN: >59
GFR NON-AFRICAN AMERICAN: >60
GLUCOSE BLD-MCNC: 135 MG/DL (ref 74–109)
HCT VFR BLD CALC: 48.6 % (ref 42–52)
HEMOGLOBIN: 16 G/DL (ref 14–18)
IMMATURE GRANULOCYTES #: 0.1 K/UL
LYMPHOCYTES ABSOLUTE: 3 K/UL (ref 1.1–4.5)
LYMPHOCYTES RELATIVE PERCENT: 24.4 % (ref 20–40)
MCH RBC QN AUTO: 30.4 PG (ref 27–31)
MCHC RBC AUTO-ENTMCNC: 32.9 G/DL (ref 33–37)
MCV RBC AUTO: 92.2 FL (ref 80–94)
MONOCYTES ABSOLUTE: 0.9 K/UL (ref 0–0.9)
MONOCYTES RELATIVE PERCENT: 7.7 % (ref 0–10)
NEUTROPHILS ABSOLUTE: 8 K/UL (ref 1.5–7.5)
NEUTROPHILS RELATIVE PERCENT: 65.2 % (ref 50–65)
PDW BLD-RTO: 13.1 % (ref 11.5–14.5)
PLATELET # BLD: 170 K/UL (ref 130–400)
PMV BLD AUTO: 8.2 FL (ref 9.4–12.4)
POTASSIUM REFLEX MAGNESIUM: 3.9 MMOL/L (ref 3.5–5)
RBC # BLD: 5.27 M/UL (ref 4.7–6.1)
SODIUM BLD-SCNC: 138 MMOL/L (ref 136–145)
TOTAL PROTEIN: 5.4 G/DL (ref 6.6–8.7)
WBC # BLD: 12.3 K/UL (ref 4.8–10.8)

## 2022-10-11 PROCEDURE — 99233 SBSQ HOSP IP/OBS HIGH 50: CPT | Performed by: PSYCHIATRY & NEUROLOGY

## 2022-10-11 PROCEDURE — 97165 OT EVAL LOW COMPLEX 30 MIN: CPT

## 2022-10-11 PROCEDURE — 97162 PT EVAL MOD COMPLEX 30 MIN: CPT

## 2022-10-11 PROCEDURE — 6370000000 HC RX 637 (ALT 250 FOR IP): Performed by: PSYCHIATRY & NEUROLOGY

## 2022-10-11 PROCEDURE — 85025 COMPLETE CBC W/AUTO DIFF WBC: CPT

## 2022-10-11 PROCEDURE — 1210000000 HC MED SURG R&B

## 2022-10-11 PROCEDURE — 80053 COMPREHEN METABOLIC PANEL: CPT

## 2022-10-11 PROCEDURE — 97535 SELF CARE MNGMENT TRAINING: CPT

## 2022-10-11 PROCEDURE — 97116 GAIT TRAINING THERAPY: CPT

## 2022-10-11 PROCEDURE — 6360000002 HC RX W HCPCS: Performed by: PSYCHIATRY & NEUROLOGY

## 2022-10-11 PROCEDURE — 94640 AIRWAY INHALATION TREATMENT: CPT

## 2022-10-11 PROCEDURE — 36415 COLL VENOUS BLD VENIPUNCTURE: CPT

## 2022-10-11 PROCEDURE — 2700000000 HC OXYGEN THERAPY PER DAY

## 2022-10-11 RX ORDER — ENOXAPARIN SODIUM 100 MG/ML
30 INJECTION SUBCUTANEOUS 2 TIMES DAILY
Status: DISCONTINUED | OUTPATIENT
Start: 2022-10-12 | End: 2022-10-12 | Stop reason: HOSPADM

## 2022-10-11 RX ORDER — LEVETIRACETAM 500 MG/1
500 TABLET ORAL 2 TIMES DAILY
Status: DISCONTINUED | OUTPATIENT
Start: 2022-10-11 | End: 2022-10-12 | Stop reason: HOSPADM

## 2022-10-11 RX ORDER — CLOPIDOGREL BISULFATE 75 MG/1
75 TABLET ORAL DAILY
Status: DISCONTINUED | OUTPATIENT
Start: 2022-10-11 | End: 2022-10-12 | Stop reason: HOSPADM

## 2022-10-11 RX ADMIN — BUDESONIDE 250 MCG: 0.25 SUSPENSION RESPIRATORY (INHALATION) at 06:18

## 2022-10-11 RX ADMIN — HYDROCODONE BITARTRATE AND ACETAMINOPHEN 1 TABLET: 10; 325 TABLET ORAL at 01:58

## 2022-10-11 RX ADMIN — ARFORMOTEROL TARTRATE 15 MCG: 15 SOLUTION RESPIRATORY (INHALATION) at 06:18

## 2022-10-11 RX ADMIN — ATORVASTATIN CALCIUM 80 MG: 80 TABLET, FILM COATED ORAL at 21:09

## 2022-10-11 RX ADMIN — CLOPIDOGREL BISULFATE 75 MG: 75 TABLET ORAL at 08:14

## 2022-10-11 RX ADMIN — LOSARTAN POTASSIUM 100 MG: 100 TABLET, FILM COATED ORAL at 08:14

## 2022-10-11 RX ADMIN — ASPIRIN 81 MG: 81 TABLET, COATED ORAL at 08:14

## 2022-10-11 RX ADMIN — HYDROCODONE BITARTRATE AND ACETAMINOPHEN 1 TABLET: 10; 325 TABLET ORAL at 22:13

## 2022-10-11 RX ADMIN — DULOXETINE 60 MG: 60 CAPSULE, DELAYED RELEASE ORAL at 08:14

## 2022-10-11 RX ADMIN — FUROSEMIDE 20 MG: 20 TABLET ORAL at 21:09

## 2022-10-11 RX ADMIN — LEVETIRACETAM 500 MG: 500 TABLET, FILM COATED ORAL at 21:09

## 2022-10-11 RX ADMIN — HYDROCODONE BITARTRATE AND ACETAMINOPHEN 1 TABLET: 10; 325 TABLET ORAL at 16:12

## 2022-10-11 RX ADMIN — FUROSEMIDE 20 MG: 20 TABLET ORAL at 08:14

## 2022-10-11 RX ADMIN — ENOXAPARIN SODIUM 40 MG: 100 INJECTION SUBCUTANEOUS at 08:14

## 2022-10-11 RX ADMIN — HYDROCODONE BITARTRATE AND ACETAMINOPHEN 1 TABLET: 10; 325 TABLET ORAL at 09:17

## 2022-10-11 RX ADMIN — LEVETIRACETAM 500 MG: 500 TABLET, FILM COATED ORAL at 08:14

## 2022-10-11 RX ADMIN — BUDESONIDE 250 MCG: 0.25 SUSPENSION RESPIRATORY (INHALATION) at 19:18

## 2022-10-11 RX ADMIN — ARFORMOTEROL TARTRATE 15 MCG: 15 SOLUTION RESPIRATORY (INHALATION) at 19:18

## 2022-10-11 ASSESSMENT — PAIN SCALES - GENERAL
PAINLEVEL_OUTOF10: 2
PAINLEVEL_OUTOF10: 7
PAINLEVEL_OUTOF10: 5
PAINLEVEL_OUTOF10: 7
PAINLEVEL_OUTOF10: 5
PAINLEVEL_OUTOF10: 3

## 2022-10-11 ASSESSMENT — PAIN DESCRIPTION - ORIENTATION
ORIENTATION: MID

## 2022-10-11 ASSESSMENT — PAIN SCALES - WONG BAKER: WONGBAKER_NUMERICALRESPONSE: 0

## 2022-10-11 ASSESSMENT — PAIN DESCRIPTION - PAIN TYPE: TYPE: CHRONIC PAIN

## 2022-10-11 ASSESSMENT — PAIN - FUNCTIONAL ASSESSMENT: PAIN_FUNCTIONAL_ASSESSMENT: ACTIVITIES ARE NOT PREVENTED

## 2022-10-11 ASSESSMENT — PAIN DESCRIPTION - LOCATION
LOCATION: BACK

## 2022-10-11 ASSESSMENT — PAIN DESCRIPTION - DESCRIPTORS: DESCRIPTORS: SORE

## 2022-10-11 ASSESSMENT — PAIN DESCRIPTION - FREQUENCY: FREQUENCY: CONTINUOUS

## 2022-10-11 ASSESSMENT — PAIN DESCRIPTION - ONSET: ONSET: ON-GOING

## 2022-10-11 NOTE — ACP (ADVANCE CARE PLANNING)
Advance Care Planning   Healthcare Decision Maker:    Primary Decision Maker: Toshia Santamaria [facility] - [phone]    Outcome: Patient named his Primary Decision Maker per state law.       Electronically signed by Tamra Rojas MA [facility] on 10/11/2022 at 2:24 PM

## 2022-10-11 NOTE — PROGRESS NOTES
Physical Therapy  Facility/Department: [facility] SURG SERVICES  Physical Therapy Initial Assessment    Name: Sofiya Sam  : 1955  MRN: [id number]  Date of Service: 10/11/2022    Discharge Recommendations:  Continue to assess pending progress, 24 hour supervision or assist, Patient would benefit from continued therapy after discharge, Home with assist PRN          Patient Diagnosis(es): There were no encounter diagnoses. Past Medical History:  has a past medical history of Carotid artery stenosis, Cellulitis, Chest pain, Chronic respiratory disease, Claudication ([address] COPD (chronic obstructive pulmonary disease) ([address] Depression, Essential hypertension, History of measles, Hyperlipidemia, Kyphosis, PVD (peripheral vascular disease) ([facility] [address] Shortness of breath, and Smoker. Past Surgical History:  has a past surgical history that includes Vasectomy; other surgical history (2012 [facility]); Carotid endarterectomy; Colonoscopy; and Cardiac catheterization. Assessment   Body Structures, Functions, Activity Limitations Requiring Skilled Therapeutic Intervention: Decreased functional mobility ; Decreased balance  Assessment: Pt. will benefit from PT during acute care stay to decrease impairments. Pt. a slight fall risk due to decreased balance. Anticipate pt will be safe to d/c home with family A 24 hrs a day initially and then PRN A. Encouraged out of bed activity and will encourage progressive mobility.   Treatment Diagnosis: impaired gait and mobility  Therapy Prognosis: Good  Decision Making: Medium Complexity  Barriers to Learning: none  Requires PT Follow-Up: Yes  Activity Tolerance  Activity Tolerance: Patient tolerated treatment well     Plan   Physcial Therapy Plan  General Plan: 5-7 times per week  Current Treatment Recommendations: Functional mobility training, Balance training  Safety Devices  Type of Devices: Call light within reach, Patient at risk for falls, Gait belt, Left in chair, Nurse notified (reclined, no alarm available)     Restrictions  Restrictions/Precautions  Restrictions/Precautions: Fall Risk  Required Braces or Orthoses?: No     Subjective   Pain: none  General  Chart Reviewed: Yes  Patient assessed for rehabilitation services?: Yes  Response To Previous Treatment: Not applicable  Family / Caregiver Present: No  Referring Practitioner: Luna Johnson MD  Referral Date : 10/09/22  Diagnosis: acute ischemic stroke vs seizure  Follows Commands: Within Functional Limits  General Comment  Comments: Nick MENCHACA PT. Subjective  Subjective: Pt. willing to work with therapy. Needs to go to the bathroom.          Social/Functional History  Social/Functional History  Lives With: Significant other  Type of Home: House  Home Layout: One level  Home Access: Stairs to enter without rails  Entrance Stairs - Number of Steps: 2-3  Has the patient had two or more falls in the past year or any fall with injury in the past year?: No  ADL Assistance: Independent  Ambulation Assistance: Independent  Transfer Assistance: Independent  Active : Yes  Occupation: On disability  Type of Occupation: auto body  Vision/Hearing  Vision  Vision: Within Functional Limits  Hearing  Hearing: Within functional limits    Cognition   Orientation  Overall Orientation Status: Within Normal Limits  Cognition  Overall Cognitive Status: WFL     Objective   Heart Rate: 55  Heart Rate Source: Monitor  BP: 125/75  MAP (Calculated): 91.67  Resp: 14  SpO2: 95 %  O2 Device: None (Room air)     Observation/Palpation  Posture: Good  Gross Assessment  Sensation: Intact (denies issues)     AROM RLE (degrees)  RLE AROM: WNL  AROM LLE (degrees)  LLE AROM : WNL  Strength RLE  Strength RLE: WNL  Strength LLE  Strength LLE: WNL           Bed mobility  Supine to Sit: Supervision  Sit to Supine: Unable to assess  Bed Mobility Comments: sat on EOB x 1 min, no dizziness  Transfers  Sit to Stand: Stand by assistance  Stand to Sit: Stand by assistance  Comment: stood in bathroom x 1-2 mins to urinate  Ambulation  Surface: Level tile  Device: No Device  Assistance: Contact guard assistance;Stand by assistance  Quality of Gait: slightly unsteady at times-mainly SBA  Gait Deviations: Slow Trena;Decreased step length;Decreased step height  Distance: 20', 200'  Comments: scissored at times     Balance  Posture: Good  Sitting - Static: Good;+  Sitting - Dynamic: Good;-  Standing - Static: Fair;+  Standing - Dynamic: Fair;-           OutComes Score                                                  AM-PAC Score             Tinneti Score       Goals  Short Term Goals  Time Frame for Short Term Goals: 2 wks  Short Term Goal 1: supine to sit indep  Short Term Goal 2: sit to stand indep  Short Term Goal 3: amb. 300' indep  Short Term Goal 4: up ad stella in room  Short Term Goal 5: standing balance activities x 20' side stepping, tandem walking, retro walking CGA  Patient Goals   Patient Goals : go home tomorrow       Education  Patient Education  Education Given To: Patient  Education Provided: Role of Therapy  Education Provided Comments: use of call light, staff A  Education Method: Verbal  Education Outcome: Verbalized understanding;Continued education needed      Therapy Time   Individual Concurrent Group Co-treatment   Time In           Time Out           Minutes                   Suman Suh PT     Electronically signed by Suman Suh PT on 10/11/2022 at 10:48 AM

## 2022-10-11 NOTE — PROGRESS NOTES
Patient:   Gilbert Turner  MR#:    [id number]   Room:    On license of [facility]   YOB: 1955  Date of Progress Note: 10/11/2022  Time of Note                           6:33 AM  Consulting Physician:   Corona Brown M.D. Attending Physician:  Corona Brown MD     Chief complaint Left-sided weakness    S:This 78 y.o. male with a past medical history significant for coronary disease, hypertension, peripheral vascular disease and smoking along with recent acute ischemic stroke in September 2022 is seen for evaluation of left-sided weakness. The patient presented to an outside hospital after having acute flaccid paralysis of the left side. He was seen by teleneurology at an outside facility and was given intravenous tPA with essentially complete resolution of his symptoms. He was transferred for higher level of care. As per the ER physician at the outside facility the patient had an acute ischemic stroke 1 month ago with multifocal infarcts in the right hemisphere with right carotid occlusion. He indicates that he has been on aspirin and Plavix for quite some time and he was continued on this after the stroke. Currently denies diplopia, dysarthria, dysphagia, weakness or numbness. Denies any headache or confusion. Some low blood pressure after admission and given fluid bolus. Patient now indicates he has had 4-5 similar episodes similar to admission. Indicates starts with shaking of the left leg then left arm and then falls with weakness. No clear confusion. No complaints overnight.      REVIEW OF SYSTEMS:  Constitutional: No fevers No chills  Neck:No stiffness  Respiratory: No shortness of breath  Cardiovascular: No chest pain No palpitations  Gastrointestinal: No abdominal pain    Genitourinary: No Dysuria  Neurological: No headache, no confusion    Past Medical History:      Diagnosis Date    Carotid artery stenosis     Cellulitis     Chest pain     Chronic respiratory disease     Claudication ([facility] [address] mg, Oral, Daily, 81 mg at 10/10/22 1928 **OR** aspirin suppository 300 mg, 300 mg, Rectal, Daily, Elie Head MD    perflutren lipid microspheres (DEFINITY) injection 1.65 mg, 1.5 mL, IntraVENous, ONCE PRN, Elie Head MD    atorvastatin (LIPITOR) tablet 80 mg, 80 mg, Oral, Nightly, Elie Head MD, 80 mg at 10/10/22 1928    budesonide (PULMICORT) nebulizer suspension 250 mcg, 0.25 mg, Nebulization, BID, 250 mcg at 10/11/22 0618 **AND** Arformoterol Tartrate (BROVANA) nebulizer solution 15 mcg, 15 mcg, Nebulization, BID, Elie Head MD, 15 mcg at 10/11/22 8418    Allergies:  Neosporin original [bacitracin-neomycin-polymyxin] and Dye [iodides]    Social History:   TOBACCO:   reports that he has been smoking cigarettes. He has a 20.00 pack-year smoking history. He has never used smokeless tobacco.  ETOH:   reports that he does not currently use alcohol. Family History:       Problem Relation Age of Onset    Heart Disease Brother         CAD    Heart Disease Father         CAD         PHYSICAL EXAM:  BP (!) 133/36   Pulse 58   Temp 97.8 °F (36.6 °C) (Temporal)   Resp 19   Ht 5' 8\" (1.727 m)   Wt 244 lb 9.6 oz (110.9 kg)   SpO2 90%   BMI 37.19 kg/m²     Constitutional - well developed, well nourished. Eyes - conjunctiva normal.   Ear, nose, throat - No scars, masses, or lesions over external nose or ears, no atrophy of tongue  Neck-symmetric, no masses noted, no jugular vein distension  Respiration- chest wall appears symmetric, good expansion,   normal effort without use of accessory muscles  Musculoskeletal - no significant wasting of muscles noted, no bony deformities  Extremities-no clubbing, cyanosis or edema  Skin - warm, dry, and intact. No rash, erythema, or pallor.   Psychiatric - mood, affect, and behavior appear normal.      Neurological exam  Awake, alert, fluent oriented appropriate affect  Attention and concentration appear appropriate  Recent and remote memory appears unremarkable  Speech normal without dysarthria  No clear issues with language of fund of knowledge     Cranial Nerve Exam     CN III, IV,VI-EOMI, No nystagmus, conjugate eye movements, no ptosis    CN VII-no facial assymetry       Motor Exam  antigravity throughout upper and lower extremities bilaterally      Tremors- no tremors in hands or head noted     Gait  Not tested     Nursing/pcp notes, imaging,labs and vitals reviewed. PT,OT and/or speech notes reviewed    Lab Results   Component Value Date    WBC 12.6 (H) 10/10/2022    HGB 15.6 10/10/2022    HCT 47.1 10/10/2022    MCV 92.0 10/10/2022     10/10/2022     Lab Results   Component Value Date     07/23/2012    K 4.4 07/23/2012     07/23/2012    CO2 25 07/23/2012    BUN 20 07/23/2012    CREATININE 0.9 07/23/2012    GLUCOSE 107 07/23/2012    CALCIUM 9.2 07/23/2012    LABGLOM 93 07/23/2012   No results found for: INR, PROTIME      MRI BRAIN WO CONTRAST [[id number]]    Resulted: 10/11/22 0233    Updated: 10/11/22 0236    Narrative:     NO PRIOR REPORT AVAILABLE   <Addendum Signed by Juliette Reece MD at 11-Oct-2022 03:33:04 AM   Confirmation for patient:  Frank Luu   Findings were communicated to [facility], on 10/11/2022 at 3:31 am who confirms having received the report. Jocelynn Earnest takes responsibility for providing the report to the referring clinician. No further action required by the reading radiologist. If the   referring clinician has any further question's please call customer service [phone], option 1. Critical Documentation Completed. (SAB)   Addendum End>   Exam: MRI OF THE BRAIN WITHOUTINTRAVENOUS CONTRAST   Clinical data:Stroke. Status post tPA. Left-sided weakness. Technique: Multiplanar multi-sequence MRI of the brain without intravenous gadolinium. Diffusion-weighted imaging is submitted. Prior studies: No prior studies submitted.    Findings: Multiple foci of restriction of diffusion are seen involving the right fronto parietal lobes, centrum semiovale, as well as right postcentral gyrus superiorly, consider multifocal embolic infarcts, acute/subacute. Largest of these measures   approximately 2.1 x 0.4 cm. There is asymmetric loss of flow void in the distal right ICA, from the level of the visualized distal right cervical ICA, and including skullbase and cavernous portions and proximal supraclinoid segment, suspicious for   thrombotic occlusion. Flow void is seen in the right MCA, although it appears to be somewhat attenuated. The remainder of the major intracranial flow voids appear grossly preserved. Age-related neuroparenchymal volume loss is seen. Fazekas grade I small   vessel ischemic changes are seen in the periventricular and deep white matter. A focus of encephalomalacia/old infarct in the right occipital lobe. No evidence of acute hemorrhage, mass or mass effect is seen. The ventricular system is normal in size,   shape, and contour. No hydrocephalus or abnormal extra-axial fluid collections are present. The marrow signal within the skull base and calvarium is intact. Significant bilateral mastoid opacification, predominantly on the right, correlate for   mastoiditis. Visualized paranasal sinuses appear grossly clear. DWI/ADC: Areas involved as described above. Impression:     1. Multifocal right-sided restricted diffusion, consistent with multifocal acute/subacute infarcts, as described. 2. Loss of flow void in the distal right ICA, as above, suspicious for thrombotic occlusion. 3. No evidence of acute intracranial hemorrhage. 4. Significant bilateral mastoid opacification, greater on the right, correlate for mastoiditis. 5. Atrophy, scattered bilateral white matter changes, as described. Recommendation: Conventional angiography/neurovascular service evaluation, if clinically warranted.    Amended by Sophie Lopez MD at 11-Oct-2022 03:33:04 AM   Electronically Signed by Sophie Lopez MD at 11-Oct-2022 03:19:30 AM                RECORD REVIEW: Previous medical records, medications were reviewed at today's visit    IMPRESSION:   Left sides shaking with weakness-suspect may have focal seizure rather than stroke- status post tPA     Patient with a known right carotid occlusion and recent right hemispheric infarcts     Examination  Awake, alert, oriented, fluent  Mild left lower facial droop  Unclear some trace giveaway weakness in the left arm and left leg       MRI of the brain with multifocal stroke right hemisphere-suspect this may be simply a reflection of his recent stroke  a month ago  MRA of the head and neck pending   Carotid ultrasound-occlusion right carotid  Echocardiogram-unremarkable EF 65% negative bubble study  LDL 25  Hemoglobin A1c 6.9  Telemetry    Start Keppra 500 mg bid for presumed focal seizures     No antiplatelets/anticoagulants 24 hours post tPA  ASA/Plavix/statin     Hypertension-continue to monitor  Hyperlipidemia-on statin  COPD-on nebulizers  Back pain-Cymbalta  DVT prophylaxis-Lovenox     PT/OT/speech     Supportive care    Ok to floor  Hopefully home soon      [address] [facility]  Dr Leni Gurrola

## 2022-10-11 NOTE — CARE COORDINATION
Date / Time of Evaluation:   10/11/2022    2:36 PM  Assessment Completed by:   Alissa Huff RN, BSN      Patient Name:   Darinel Jordan  MRN:   [id number]  YOB: 1955    Patient Admission Status:   Inpatient    Patient Contact Information:    [address] [place] [address] Road [facility] [id number] (home)   Telephone Information:   Mobile [phone]     Above information verified? [x]   Yes  []   No    (Best Practice:   Have patient/caregiver verify above address and phone number by stating out loud their current address and reachable phone number. Initial Assessment Completed at bedside with:      [x]   Patient  []   Family/Caregiver/Guardian   []   Other:      Current PCP:    Faisal oMntenegro MD    PCP verified? [x]   Yes  []   No    Emergency Contacts:    Extended Emergency Contact Information  Primary Emergency Contact: [place] [facility]  Mobile Phone: [phone]  Relation: Other  Secondary Emergency Contact: Olivier Russell  Mobile Phone: [phone]  Relation: Child    Advance Directives:    Does Mr. Matt oMon have an advance directive in his electronic medical record? []   Yes  [x]   No    Code Status:   Full Code      Have you been vaccinated for COVID-19 (SARS-CoV-2)? []   Yes  []   No                   If so, when?     Which :         []   Pfizer-[employer]  []   Moderna  []   Carlean Alexia  []   Other:       Do you have any of the following unmet social needs that would keep you from returning home safely:    []   Yes  [x]   No                    Unmet Social Needs:           []   Living Situation/Housing  []   Food  []   Stroke Education   []   Utilities  []   Personal Safety  []   Financial Strain  []   Employment  []   Mental Health  []   Substance Abuse  []   Transportation Barriers    Additional Unmet Social Needs Notes:   NA    Financial:    Payor: [place] / Plan: [place] / Product Type: *No Product type* /     Pre-Cert required for SNF:     [x]   Yes  []   No    Have Long Term Care Insurance:      []   Yes  [x]   No    Pharmacy:    [email or web address] [address] [phone]  [address] [place] [address] Road [address]  Phone: [phone] Fax: [phone]    [facility], [place] [address] [facility]  [phone] - F [phone]  [address] [place] [address]  Phone: [phone] Fax: [phone]    Potential assistance purchasing medications? []   Yes  [x]   No    ADLS:       Support System:   Spouse/Significant Other, Children, Family Members    Current Home Environment:       Steps:       [x]   Yes  []   No    If yes, how many? 2-3    Plans to RETURN to current housing:     [x]   Yes  []   No    Barriers to RETURNING to current housing:  NA    Currently ACTIVE with Home Health CARE:      []   Yes  [x]   No    Home Health Care Agency:  NA    DME Provider:   NA    Had HOME OXYGEN prior to admission:      [x]   Yes  []   No    Oxygen Company:       Has a pulse oximetry unit at home:     [x]   Yes  []   No    Informed of need to bring portable home O2 tank to hospital on day of DISCHARGE:      [x]   Yes  []   No    Name of person committed to bringing portable tank at discharge:     Significant other    Transition Plan:  Home with SO/Family    Transportation PLAN for Discharge:  SO    Patient Deficits:    [x]   Yes   []   No    If yes:    []   Confusion/Memory  []   Visual  [x]   Motor/Sensory         []   Right arm         []   Right leg         [x]   Left arm         [x]   Left leg  []   Language/Speech         []   Aphasia         []   Dysarthria         []   Swallow    NIH Stroke Scale  Interval: Hand-off/Transfer  Level of Consciousness (1a): Alert  LOC Questions (1b): Answers both correctly  LOC Commands (1c): Performs both tasks correctly  Best Gaze (2): Normal  Visual (3): No visual loss  Facial Palsy (4): Normal symmetrical movement  Motor Arm, Left (5a):  No drift  Motor Arm, Right (5b): No drift  Motor Leg, Left (6a): No drift  Motor Leg, Right (6b): No drift  Limb Ataxia (7): Absent  Sensory (8): Normal  Best Language (9): No aphasia  Dysarthria (10): Normal  Extinction and Inattention (11): No abnormality  Total: 0    Nesbit Coma Scale  Eye Opening: Spontaneous  Best Verbal Response: Oriented  Best Motor Response: Obeys commands  Nesbit Coma Scale Score: 15    Swallow Screening  Is the patient unable to remain alert for testing?: No  Is the patient on a modified diet (thickened liquids) due to pre-existing dysphagia?: No  Is there presence of existing enteral tube feeding via the stomach or nose?: No  Is there presence of head-of-bed restrictions (less than 30 degrees)?: No  Is there presence of tracheotomy tube?: No  Is the patient ordered nothing-by-mouth status?: No  3 oz Water Swallow Screen: Pass    Patient Deficit Notes:     S/S are resolving. Working with PT/OT/SLP    Additional CM/SW Notes:    Patient lives at home with SO. He states that he uses O2 at home. He states that he plans to return home. No needs identified. Patient's nurse gave him the Stroke Booklet and Pamphlets.        10/11/22 1432   Service Assessment   Patient Orientation Alert and Oriented   History Provided By Patient   Primary Caregiver Self   Support Systems Spouse/Significant Other;Children;Family Members   Patient's Healthcare Decision Maker is: Patient Declined (Legal Next of Kin Remains as Decision Maker)   PCP Verified by CM Yes   Last Visit to PCP Within last 3 months   Prior Functional Level Independent in ADLs/IADLs   Current Functional Level Independent in ADLs/IADLs   Can patient return to prior living arrangement Yes   Ability to make needs known: Good   Family able to assist with home care needs: Yes   Financial Resources Medicare   Social/Functional History   Lives With Significant other   Type of [address] One level   Home Access Stairs to enter without rails   Entrance Stairs - Number of Steps 2-3   Bathroom Toilet Standard   Bathroom Accessibility Accessible   Home Equipment Oxygen   Receives Help From Family;Friend(s)   ADL Assistance Independent   Ambulation Assistance Independent   Transfer Assistance Independent   Active  Yes   Education high school +   Occupation On disability   Type of Occupation auto body   Discharge Planning   Type of [address] Prior To Admission Oxygen Therapy   Potential Assistance Needed N/A   DME Ordered? No   Potential Assistance Purchasing Medications No   Type of Home Care Services None   Patient expects to be discharged to: House   Follow Up Appointment: Best Day/Time  Tuesday AM   One/Two Story Residence One story   History of falls?  0   Electronically signed by Joann Miller RN, BSN on 10/11/2022 at 2:40 PM

## 2022-10-11 NOTE — PROGRESS NOTES
Occupational Therapy  Facility/Department: [facility] SURG SERVICES  Occupational Therapy Initial Assessment    Name: Gilbert Turner  : 1955  MRN: [id number]  Date of Service: 10/11/2022    Discharge Recommendations:             Patient Diagnosis(es): There were no encounter diagnoses. Past Medical History:  has a past medical history of Carotid artery stenosis, Cellulitis, Chest pain, Chronic respiratory disease, Claudication ([facility] [address] COPD (chronic obstructive pulmonary disease) ([facility] [address] Depression, Essential hypertension, History of measles, Hyperlipidemia, Kyphosis, PVD (peripheral vascular disease) ([facility] [address] Shortness of breath, and Smoker. Past Surgical History:  has a past surgical history that includes Vasectomy; other surgical history (2012 [facility]); Carotid endarterectomy; Colonoscopy; and Cardiac catheterization. Treatment Diagnosis: Suspected CVA      Assessment   Performance deficits / Impairments: Decreased functional mobility   Assessment: Pt met 1 STG as he is able to move around safely with no LOB and no AD. BUE strength, ADLs and tranfers appear to have returned to baseline.   Seen for eval and 1 treatment and d/c OT  Treatment Diagnosis: Suspected CVA  Prognosis: Good  Decision Making: Low Complexity  REQUIRES OT FOLLOW-UP: No  Activity Tolerance  Activity Tolerance: Patient Tolerated treatment well        Plan   Occupational Therapy Plan  Times Per Week: 1 visit and D/C OT     Restrictions  Restrictions/Precautions  Restrictions/Precautions: Fall Risk  Required Braces or Orthoses?: No    Subjective   General  Chart Reviewed: Yes  Patient assessed for rehabilitation services?: Yes  Family / Caregiver Present: No  Diagnosis: Suspected CVA  General Comment  Comments: Pt. reports LUE is back to normal and he feels fine  none  Social/Functional History  Social/Functional History  Lives With: Significant other  Type of Home: House  Home Layout: One level  Home Access: Stairs to enter without rails  Entrance Stairs - Number of Steps: 2-3  Has the patient had two or more falls in the past year or any fall with injury in the past year?: No  ADL Assistance: Independent  Ambulation Assistance: Independent  Transfer Assistance: Independent  Active : Yes  Occupation: On disability  Type of Occupation: auto body       Objective   Heart Rate: 55  Heart Rate Source: Monitor  BP: 125/75  MAP (Calculated): 91.67  Resp: 14  SpO2: 95 %  O2 Device: None (Room air)          Observation/Palpation  Posture: Good  Safety Devices  Type of Devices: Call light within reach; Patient at risk for falls;Gait belt;Left in chair;Nurse notified (reclined, no alarm available)        AROM: Within functional limits  Strength:  Within functional limits  Coordination: Within functional limits  ADL  Feeding: Independent  Grooming: Independent;Supervision  UE Bathing: Supervision  LE Bathing: Supervision  UE Dressing: Supervision  LE Dressing: Supervision  Toileting: Supervision     Activity Tolerance  Activity Tolerance: Patient tolerated treatment well     Transfers  Stand Step Transfers: Supervision  Sit to stand: Supervision  Transfer Comments: No AD  Vision  Vision: Within Functional Limits  Hearing  Hearing: Within functional limits  Cognition  Overall Cognitive Status: WFL  Orientation  Overall Orientation Status: Within Normal Limits                                           G-Code     OutComes Score                                                  AM-PAC Score             Tinneti Score       Goals  Short Term Goals  Time Frame for Short Term Goals: 1 week  Short Term Goal 1: Pt. able to perform light ambulatory ADLs without AD supervision and no LOB -Goal MET  Long Term Goals  Long Term Goal 1: Return to PLOF       Therapy Time   Individual Concurrent Group Co-treatment   Time In           Time Out           Minutes                   Jose Lyman OT

## 2022-10-11 NOTE — PROGRESS NOTES
Automatic Dose Adjustment of                Subcutaneous Anticoagulant for Prophylaxis    Suzan Esparza is a 78 y.o. male. Recent Labs     10/11/22  0820   CREATININE 0.7       Estimated Creatinine Clearance: 125 mL/min (based on SCr of 0.7 mg/dL). Weight:  Wt Readings from Last 1 Encounters:   10/11/22 244 lb 9.6 oz (110.9 kg)           Pharmacy has adjusted subcutaneous anticoagulant for prophylaxis to Enoxaparin 30 mg SC twice daily based on the patient's weight and estimated CrCl per [facility] policy.                Electronically signed by Dario Patton, [address] [place] [address] on 10/11/2022 at 9:52 AM

## 2022-10-12 ENCOUNTER — TELEPHONE (OUTPATIENT)
Dept: NEUROLOGY | Age: 67
End: 2022-10-12

## 2022-10-12 VITALS
RESPIRATION RATE: 17 BRPM | SYSTOLIC BLOOD PRESSURE: 153 MMHG | TEMPERATURE: 97.7 F | HEART RATE: 68 BPM | BODY MASS INDEX: 37.07 KG/M2 | HEIGHT: 68 IN | WEIGHT: 244.6 LBS | OXYGEN SATURATION: 94 % | DIASTOLIC BLOOD PRESSURE: 85 MMHG

## 2022-10-12 PROCEDURE — 94640 AIRWAY INHALATION TREATMENT: CPT

## 2022-10-12 PROCEDURE — 99239 HOSP IP/OBS DSCHRG MGMT >30: CPT | Performed by: PSYCHIATRY & NEUROLOGY

## 2022-10-12 PROCEDURE — 6370000000 HC RX 637 (ALT 250 FOR IP): Performed by: PSYCHIATRY & NEUROLOGY

## 2022-10-12 PROCEDURE — 2700000000 HC OXYGEN THERAPY PER DAY

## 2022-10-12 PROCEDURE — 6360000002 HC RX W HCPCS: Performed by: PSYCHIATRY & NEUROLOGY

## 2022-10-12 RX ORDER — ATORVASTATIN CALCIUM 80 MG/1
80 TABLET, FILM COATED ORAL NIGHTLY
Qty: 30 TABLET | Refills: 0 | Status: SHIPPED | OUTPATIENT
Start: 2022-10-12

## 2022-10-12 RX ORDER — LEVETIRACETAM 500 MG/1
500 TABLET ORAL 2 TIMES DAILY
Qty: 60 TABLET | Refills: 11 | Status: SHIPPED | OUTPATIENT
Start: 2022-10-12

## 2022-10-12 RX ADMIN — DULOXETINE 60 MG: 60 CAPSULE, DELAYED RELEASE ORAL at 08:19

## 2022-10-12 RX ADMIN — ASPIRIN 81 MG: 81 TABLET, COATED ORAL at 08:19

## 2022-10-12 RX ADMIN — HYDROCODONE BITARTRATE AND ACETAMINOPHEN 1 TABLET: 10; 325 TABLET ORAL at 08:19

## 2022-10-12 RX ADMIN — LOSARTAN POTASSIUM 100 MG: 100 TABLET, FILM COATED ORAL at 08:19

## 2022-10-12 RX ADMIN — BUDESONIDE 250 MCG: 0.25 SUSPENSION RESPIRATORY (INHALATION) at 07:41

## 2022-10-12 RX ADMIN — LEVETIRACETAM 500 MG: 500 TABLET, FILM COATED ORAL at 08:19

## 2022-10-12 RX ADMIN — FUROSEMIDE 20 MG: 20 TABLET ORAL at 08:19

## 2022-10-12 RX ADMIN — ENOXAPARIN SODIUM 30 MG: 100 INJECTION SUBCUTANEOUS at 08:19

## 2022-10-12 RX ADMIN — CLOPIDOGREL BISULFATE 75 MG: 75 TABLET ORAL at 08:19

## 2022-10-12 RX ADMIN — ARFORMOTEROL TARTRATE 15 MCG: 15 SOLUTION RESPIRATORY (INHALATION) at 07:41

## 2022-10-12 ASSESSMENT — PAIN DESCRIPTION - FREQUENCY: FREQUENCY: CONTINUOUS

## 2022-10-12 ASSESSMENT — PAIN DESCRIPTION - PAIN TYPE: TYPE: CHRONIC PAIN

## 2022-10-12 ASSESSMENT — PAIN DESCRIPTION - LOCATION: LOCATION: BACK

## 2022-10-12 ASSESSMENT — PAIN DESCRIPTION - DESCRIPTORS: DESCRIPTORS: ACHING;SORE

## 2022-10-12 ASSESSMENT — PAIN SCALES - GENERAL: PAINLEVEL_OUTOF10: 5

## 2022-10-12 ASSESSMENT — PAIN DESCRIPTION - ORIENTATION: ORIENTATION: MID;LOWER

## 2022-10-12 ASSESSMENT — PAIN DESCRIPTION - ONSET: ONSET: ON-GOING

## 2022-10-12 ASSESSMENT — PAIN - FUNCTIONAL ASSESSMENT: PAIN_FUNCTIONAL_ASSESSMENT: ACTIVITIES ARE NOT PREVENTED

## 2022-10-12 NOTE — DISCHARGE INSTR - ACTIVITY
Activity as tolerated  No driving for 3 months after having a seizure   Seizure precautions : no driving, avoid  heights, no tub bathing or swimming may shower no operating heavy machinery

## 2022-10-12 NOTE — CARE COORDINATION
2nd IMM Letter given to patient. Reviewed, discussed, answered questions, and signed by patient. Signed copy placed in patient's chart.      10/12/22 0919   IMM Letter   IMM Letter given to Patient/Family/Significant other/Guardian/POA/by: given to patient by Josefina Bui RN BSN    IMM Letter date given: 10/12/22   IMM Letter time given: 0915   Electronically signed by Rob Grimaldo RN, BSN on 10/12/2022 at 9:20 AM

## 2022-10-12 NOTE — TELEPHONE ENCOUNTER
Called and left patient a VM to let him know that I have him scheduled an appointment with Dr. Bhargav Baez for a HFU. Left on VM the appointment time/date/location/phone #.

## 2022-10-12 NOTE — DISCHARGE SUMMARY
Neurology Discharge Summary     Patient Identification:  Gilbert Turner is a 78 y.o. male. :  1955  Admit Date:  10/9/2022  Discharge date : 2022   Attending Provider: Corona Brown MD     Account Number: [de-identified]                                   Admission Diagnoses:   Acute ischemic stroke [facility]) [I63.9]    Discharge Diagnoses:  Principal Problem:    Acute ischemic stroke [facility])  Active Problems:    S/P admn tPA in diff fac w/n last 24 hr bef adm to crnt fac    Weakness    Carotid artery stenosis    COPD (chronic obstructive pulmonary disease) ([facility] [address]    Essential hypertension    PVD (peripheral vascular disease) with claudication ([facility] [address]    Chronic bilateral low back pain without sciatica  Resolved Problems:    * No resolved hospital problems. *      Discharge Medications:    Current Discharge Medication List             Details   levETIRAcetam (KEPPRA) 500 MG tablet Take 1 tablet by mouth 2 times daily  Qty: 60 tablet, Refills: 11      atorvastatin (LIPITOR) 80 MG tablet Take 1 tablet by mouth nightly  Qty: 30 tablet, Refills: 0                Details   furosemide (LASIX) 20 MG tablet Take 20 mg by mouth 2 times daily      HYDROcodone-acetaminophen (NORCO)  MG per tablet Take 1 tablet by mouth every 6 hours as needed for Pain. aspirin EC 81 MG EC tablet Take 81 mg by mouth daily      clopidogrel (PLAVIX) 75 MG tablet Take 75 mg by mouth daily      DULoxetine (CYMBALTA) 60 MG extended release capsule Take 60 mg by mouth daily      albuterol-ipratropium (COMBIVENT RESPIMAT)  MCG/ACT AERS inhaler Inhale 1 puff into the lungs daily      losartan (COZAAR) 100 MG tablet Take 100 mg by mouth daily      Budesonide-Formoterol Fumarate (SYMBICORT IN) Inhale  into the lungs.              Current Discharge Medication List        STOP taking these medications       meloxicam (MOBIC) 15 MG tablet Comments:   Reason for Stopping:         carvedilol (COREG) 3.125 MG tablet Comments: Reason for Stopping:         hydrochlorothiazide (HYDRODIURIL) 12.5 MG tablet Comments:   Reason for Stopping:         pravastatin (PRAVACHOL) 40 MG tablet Comments:   Reason for Stopping:                 Consults:   none    Hospital Course: This 78 y.o. male with a past medical history significant for coronary disease, hypertension, peripheral vascular disease and smoking along with recent acute ischemic stroke in September 2022 is seen for evaluation of left-sided weakness. The patient presented to an outside hospital after having acute flaccid paralysis of the left side. He was seen by teleneurology at an outside facility and was given intravenous tPA with essentially complete resolution of his symptoms. He was transferred for higher level of care. As per the ER physician at the outside facility the patient had an acute ischemic stroke 1 month ago with multifocal infarcts in the right hemisphere with right carotid occlusion. He indicates that he has been on aspirin and Plavix for quite some time and he was continued on this after the stroke. Currently denies diplopia, dysarthria, dysphagia, weakness or numbness. Denies any headache or confusion. Some low blood pressure after admission and given fluid bolus. Patient now indicates he has had 4-5 similar episodes similar to admission. Indicates starts with shaking of the left leg then left arm and then falls with weakness. No clear confusion. The patient was admitted and underwent workup. MRI brain with continued right hemispheric strokes and occluded right carotid. He was started in [address] for for presumed focal seizure. He is aware he is not to drive for 3 months following a seizure. Seizure precautions were provided. Plan DC home and follow up with me in 2 months.            Discharge Instructions     Patient Instructions:   Home  Therapy orders:  none    Discharge lab work: none  Code status: Full Code   Activity: activity as tolerated  Diet: ADULT DIET; Regular    Wound Care: none needed  Equipment: as per therapy      Yaz Bronson MD, MD    At least 35 minutes were spent in discharging the patient

## 2022-10-12 NOTE — PROGRESS NOTES
Stroke education provided to patient.     Electronically signed by Arturo Laguerre RN on 10/12/22 at 10:26 AM CDT

## 2022-10-12 NOTE — DISCHARGE INSTRUCTIONS
Discharge Instructions      Patient Instructions:   Home  Therapy orders:  none    Discharge lab work: none  Code status: Full Code   Activity: activity as tolerated  Diet: ADULT DIET;  Regular    Wound Care: none needed  Equipment: as per therapy

## 2022-10-12 NOTE — DISCHARGE INSTR - DIET
Good nutrition is important when healing from an illness, injury, or surgery. Follow any nutrition recommendations given to you during your hospital stay. If you were given an oral nutrition supplement while in the hospital, continue to take this supplement at home. You can take it with meals, in-between meals, and/or before bedtime. These supplements can be purchased at most local grocery stores, pharmacies, and chain [employer]-stores. If you have any questions about your diet or nutrition, call the hospital and ask for the dietitian.     Regular diet

## 2022-10-12 NOTE — PROGRESS NOTES
Patient:   Artemio Frances  MR#:    [id number]   Room:    7011/202-58   YOB: 1955  Date of Progress Note: 10/12/2022  Time of Note                           6:14 AM  Consulting Physician:   Tino Gilbert M.D. Attending Physician:  Tino Gilbert MD     Chief complaint Left-sided weakness    S:This 78 y.o. male with a past medical history significant for coronary disease, hypertension, peripheral vascular disease and smoking along with recent acute ischemic stroke in September 2022 is seen for evaluation of left-sided weakness. The patient presented to an outside hospital after having acute flaccid paralysis of the left side. He was seen by teleneurology at an outside facility and was given intravenous tPA with essentially complete resolution of his symptoms. He was transferred for higher level of care. As per the ER physician at the outside facility the patient had an acute ischemic stroke 1 month ago with multifocal infarcts in the right hemisphere with right carotid occlusion. He indicates that he has been on aspirin and Plavix for quite some time and he was continued on this after the stroke. Currently denies diplopia, dysarthria, dysphagia, weakness or numbness. Denies any headache or confusion. Some low blood pressure after admission and given fluid bolus. Patient now indicates he has had 4-5 similar episodes similar to admission. Indicates starts with shaking of the left leg then left arm and then falls with weakness. No clear confusion. Feels well this am without complaint. Ready to go home.     REVIEW OF SYSTEMS:  Constitutional: No fevers No chills  Neck:No stiffness  Respiratory: No shortness of breath  Cardiovascular: No chest pain No palpitations  Gastrointestinal: No abdominal pain    Genitourinary: No Dysuria  Neurological: No headache, no confusion    Past Medical History:      Diagnosis Date    Carotid artery stenosis     Cellulitis     Chest pain     Chronic respiratory disease Claudication ([facility] [address]     COPD (chronic obstructive pulmonary disease) ([facility])     Depression     Essential hypertension     History of measles     Hyperlipidemia     Kyphosis     PVD (peripheral vascular disease) ([facility])     Shortness of breath     Smoker        Past Surgical History:      Procedure Laterality Date    CARDIAC CATHETERIZATION      CAROTID ENDARTERECTOMY      COLONOSCOPY      OTHER SURGICAL HISTORY  7/23/2012 [facility]    Aortogram and runoff, Interventional procedure was attempted to cross totally occuled common iliac artery aborted after approximatley 20 to 30 minute effort  Right common femoral artery 5 [place] sheath , left common femoral artery 7 [place] sheath    VASECTOMY         Medications in Hospital:      Current Facility-Administered Medications:     levETIRAcetam (KEPPRA) tablet 500 mg, 500 mg, Oral, BID, Quincy Rogel MD, 500 mg at 10/11/22 2109    clopidogrel (PLAVIX) tablet 75 mg, 75 mg, Oral, Daily, Quincy Rogel MD, 75 mg at 10/11/22 0814    enoxaparin Sodium (LOVENOX) injection 30 mg, 30 mg, SubCUTAneous, BID, Quincy Rogel MD    ipratropium-albuterol (DUONEB) nebulizer solution 1 ampule, 1 ampule, Inhalation, Q4H PRN, Quincy Rogel MD    DULoxetine (CYMBALTA) extended release capsule 60 mg, 60 mg, Oral, Daily, Quincy Rogel MD, 60 mg at 10/11/22 0814    furosemide (LASIX) tablet 20 mg, 20 mg, Oral, BID, Quincy Rogel MD, 20 mg at 10/11/22 2109    HYDROcodone-acetaminophen (NORCO)  MG per tablet 1 tablet, 1 tablet, Oral, Q6H PRN, Quincy Rogel MD, 1 tablet at 10/11/22 2213    losartan (COZAAR) tablet 100 mg, 100 mg, Oral, Daily, Quincy Rogel MD, 100 mg at 10/11/22 0814    ondansetron (ZOFRAN-ODT) disintegrating tablet 4 mg, 4 mg, Oral, Q8H PRN **OR** ondansetron (ZOFRAN) injection 4 mg, 4 mg, IntraVENous, Q6H PRN, Quincy Rogel MD    polyethylene glycol (GLYCOLAX) packet 17 g, 17 g, Oral, Daily PRN, Quincy Rogel MD    aspirin EC tablet 81 mg, 81 mg, Oral, Daily, 81 mg at 10/11/22 0814 **OR** aspirin suppository 300 mg, 300 mg, Rectal, Daily, Diogenes Garcia MD    perflutren lipid microspheres (DEFINITY) injection 1.65 mg, 1.5 mL, IntraVENous, ONCE PRN, Diogenes Garcia MD    atorvastatin (LIPITOR) tablet 80 mg, 80 mg, Oral, Nightly, Diogenes Garcia MD, 80 mg at 10/11/22 2109    budesonide (PULMICORT) nebulizer suspension 250 mcg, 0.25 mg, Nebulization, BID, 250 mcg at 10/11/22 1918 **AND** Arformoterol Tartrate (BROVANA) nebulizer solution 15 mcg, 15 mcg, Nebulization, BID, Diogenes Garcia MD, 15 mcg at 10/11/22 1918    Allergies:  Neosporin original [bacitracin-neomycin-polymyxin] and Dye [iodides]    Social History:   TOBACCO:   reports that he has been smoking cigarettes. He has a 20.00 pack-year smoking history. He has never used smokeless tobacco.  ETOH:   reports that he does not currently use alcohol. Family History:       Problem Relation Age of Onset    Heart Disease Brother         CAD    Heart Disease Father         CAD         PHYSICAL EXAM:  BP (!) 117/43   Pulse 58   Temp 97.6 °F (36.4 °C) (Temporal)   Resp 14   Ht 5' 8\" (1.727 m)   Wt 244 lb 9.6 oz (110.9 kg)   SpO2 92%   BMI 37.19 kg/m²     Constitutional - well developed, well nourished. Eyes - conjunctiva normal.   Ear, nose, throat - No scars, masses, or lesions over external nose or ears, no atrophy of tongue  Neck-symmetric, no masses noted, no jugular vein distension  Respiration- chest wall appears symmetric, good expansion,   normal effort without use of accessory muscles  Musculoskeletal - no significant wasting of muscles noted, no bony deformities  Extremities-no clubbing, cyanosis or edema  Skin - warm, dry, and intact. No rash, erythema, or pallor.   Psychiatric - mood, affect, and behavior appear normal.      Neurological exam  Awake, alert, fluent oriented appropriate affect  Attention and concentration appear appropriate  Recent and remote memory appears unremarkable  Speech normal without dysarthria  No clear issues with language of fund of knowledge     Cranial Nerve Exam     CN III, IV,VI-EOMI, No nystagmus, conjugate eye movements, no ptosis    CN VII-no facial assymetry       Motor Exam  antigravity throughout upper and lower extremities bilaterally      Tremors- no tremors in hands or head noted     Gait  Not tested     Nursing/pcp notes, imaging,labs and vitals reviewed. PT,OT and/or speech notes reviewed    Lab Results   Component Value Date    WBC 12.3 (H) 10/11/2022    HGB 16.0 10/11/2022    HCT 48.6 10/11/2022    MCV 92.2 10/11/2022     10/11/2022     Lab Results   Component Value Date     10/11/2022    K 3.9 10/11/2022     10/11/2022    CO2 30 (H) 10/11/2022    BUN 20 10/11/2022    CREATININE 0.7 10/11/2022    GLUCOSE 135 (H) 10/11/2022    CALCIUM 8.3 (L) 10/11/2022    PROT 5.4 (L) 10/11/2022    LABALBU 3.6 10/11/2022    BILITOT 1.0 10/11/2022    ALKPHOS 45 10/11/2022    AST 15 10/11/2022    ALT 34 10/11/2022    LABGLOM >60 10/11/2022    GFRAA >59 10/11/2022   No results found for: INR, PROTIME      MRI BRAIN WO CONTRAST [[id number]]    Resulted: 10/11/22 0233    Updated: 10/11/22 0236    Narrative:     NO PRIOR REPORT AVAILABLE   <Addendum Signed by Sophie Lopez MD at 11-Oct-2022 03:33:04 AM   Confirmation for patient:  Sd Parada   Findings were communicated to [facility], on 10/11/2022 at 3:31 am who confirms having received the report. Alyssa Sanchez takes responsibility for providing the report to the referring clinician. No further action required by the reading radiologist. If the   referring clinician has any further question's please call customer service [phone], option 1. Critical Documentation Completed. (SAB)   Addendum End>   Exam: MRI OF THE BRAIN WITHOUTINTRAVENOUS CONTRAST   Clinical data:Stroke. Status post tPA. Left-sided weakness. Technique: Multiplanar multi-sequence MRI of the brain without intravenous gadolinium.  Diffusion-weighted imaging is submitted. Prior studies: No prior studies submitted. Findings: Multiple foci of restriction of diffusion are seen involving the right fronto parietal lobes, centrum semiovale, as well as right postcentral gyrus superiorly, consider multifocal embolic infarcts, acute/subacute. Largest of these measures   approximately 2.1 x 0.4 cm. There is asymmetric loss of flow void in the distal right ICA, from the level of the visualized distal right cervical ICA, and including skullbase and cavernous portions and proximal supraclinoid segment, suspicious for   thrombotic occlusion. Flow void is seen in the right MCA, although it appears to be somewhat attenuated. The remainder of the major intracranial flow voids appear grossly preserved. Age-related neuroparenchymal volume loss is seen. Fazekas grade I small   vessel ischemic changes are seen in the periventricular and deep white matter. A focus of encephalomalacia/old infarct in the right occipital lobe. No evidence of acute hemorrhage, mass or mass effect is seen. The ventricular system is normal in size,   shape, and contour. No hydrocephalus or abnormal extra-axial fluid collections are present. The marrow signal within the skull base and calvarium is intact. Significant bilateral mastoid opacification, predominantly on the right, correlate for   mastoiditis. Visualized paranasal sinuses appear grossly clear. DWI/ADC: Areas involved as described above. Impression:     1. Multifocal right-sided restricted diffusion, consistent with multifocal acute/subacute infarcts, as described. 2. Loss of flow void in the distal right ICA, as above, suspicious for thrombotic occlusion. 3. No evidence of acute intracranial hemorrhage. 4. Significant bilateral mastoid opacification, greater on the right, correlate for mastoiditis. 5. Atrophy, scattered bilateral white matter changes, as described.    Recommendation: Conventional angiography/neurovascular service evaluation, if clinically warranted.    Amended by Kristen Fenton MD at 11-Oct-2022 03:33:04 AM   Electronically Signed by Kristen Fenton MD at 11-Oct-2022 03:19:30 AM                RECORD REVIEW: Previous medical records, medications were reviewed at today's visit    IMPRESSION:   Left sides shaking with weakness-suspect may have focal seizure rather than stroke- status post tPA     Patient with a known right carotid occlusion and recent right hemispheric infarcts     Examination  Awake, alert, oriented, fluent  Mild left lower facial droop  Unclear some trace giveaway weakness in the left arm and left leg       MRI of the brain with multifocal stroke right hemisphere-suspect this may be simply a reflection of his recent stroke  a month ago  MRA of the head and neck occluded right carotid    Carotid ultrasound-occlusion right carotid  Echocardiogram-unremarkable EF 65% negative bubble study  LDL 25  Hemoglobin A1c 6.9  Telemetry    Start Keppra 500 mg bid for presumed focal seizures     No antiplatelets/anticoagulants 24 hours post tPA  ASA/Plavix/statin     Hypertension-continue to monitor  Hyperlipidemia-on statin  COPD-on nebulizers  Back pain-Cymbalta  DVT prophylaxis-Lovenox     PT/OT/speech     Supportive care    DC home      CALL WITH ANY QUESTIONS  [phone] CELL  Dr Arturo Marvin

## 2022-10-12 NOTE — TELEPHONE ENCOUNTER
Baldemar Salgado with [facility] called requesting 2 month HFU appointment.  Please reach out to patient to schedule [phone]      Thank you

## 2022-12-13 ENCOUNTER — TELEPHONE (OUTPATIENT)
Dept: NEUROLOGY | Age: 67
End: 2022-12-13

## 2022-12-13 NOTE — TELEPHONE ENCOUNTER
Spoke with the patient to see if they would like to reschedule their no-show appointment. Patient did not wish to reschedule, he states he made a full recovery.

## (undated) DEVICE — APPL CHLORAPREP W/TINT 10.5ML ORNG BX25

## (undated) DEVICE — SOLIDIFIER LIQUI LOC PLUS 2000CC

## (undated) DEVICE — SOL IRR NACL 0.9PCT BT 1000ML

## (undated) DEVICE — MODEL BT2000 P/N 700287-012KIT CONTENTS: MANIFOLD WITH SALINE AND CONTRAST PORTS, SALINE TUBING WITH SPIKE AND HAND SYRINGE, TRANSDUCER: Brand: BT2000 AUTOMATED MANIFOLD KIT

## (undated) DEVICE — TR BAND RADIAL ARTERY COMPRESSION DEVICE: Brand: TR BAND

## (undated) DEVICE — GW STARTER FXD CORE J .035 3X150CM 3MM

## (undated) DEVICE — CATH DIAG IMPULSE M/ PK 145 5FR

## (undated) DEVICE — CANN CO2/O2 NASL A/

## (undated) DEVICE — DRSNG SURESITE WNDW 4X4.5

## (undated) DEVICE — KT NDL GUIDE STRL 18GA

## (undated) DEVICE — SUP ARMBRD ART/LINE BLU

## (undated) DEVICE — MODEL AT P65, P/N 701554-001KIT CONTENTS: HAND CONTROLLER, 3-WAY HIGH-PRESSURE STOPCOCK WITH ROTATING END AND PREMIUM HIGH-PRESSURE TUBING: Brand: ANGIOTOUCH® KIT

## (undated) DEVICE — PINNACLE INTRODUCER SHEATH: Brand: PINNACLE

## (undated) DEVICE — GW STARTER FXD CORE J .035 3X260CM 3MM

## (undated) DEVICE — PTA BALLOON DILATATION CATHETER: Brand: MUSTANG™

## (undated) DEVICE — INFLATION DEVICE: Brand: ENCORE™ 26

## (undated) DEVICE — Device

## (undated) DEVICE — GW ZIPWIRE STD ANGL .035IN 150CM

## (undated) DEVICE — TIBURON BRACHIAL ANGIOGRAPHY DRAPE: Brand: CONVERTORS

## (undated) DEVICE — PK CATH CARD 30 CA/4

## (undated) DEVICE — GLIDESHEATH SLENDER STAINLESS STEEL KIT: Brand: GLIDESHEATH SLENDER

## (undated) DEVICE — NDL ART SECUR LK 18G 2 3/4IN

## (undated) DEVICE — ANGIO-SEAL VIP VASCULAR CLOSURE DEVICE: Brand: ANGIO-SEAL

## (undated) DEVICE — GW CHOICE PT XSUP .014 182CM

## (undated) DEVICE — ELECTRD PAD DEFIB A/

## (undated) DEVICE — A2000 MULTI-USE SYRINGE KIT, P/N 701277-003KIT CONTENTS: 100ML CONTRAST RESERVOIR AND TUBING WITH CONTRAST SPIKE AND CLAMP: Brand: A2000 MULTI-USE SYRINGE KIT